# Patient Record
Sex: FEMALE | Race: WHITE | NOT HISPANIC OR LATINO | Employment: FULL TIME | URBAN - METROPOLITAN AREA
[De-identification: names, ages, dates, MRNs, and addresses within clinical notes are randomized per-mention and may not be internally consistent; named-entity substitution may affect disease eponyms.]

---

## 2017-01-27 ENCOUNTER — APPOINTMENT (OUTPATIENT)
Dept: RADIOLOGY | Facility: IMAGING CENTER | Age: 27
End: 2017-01-27
Attending: FAMILY MEDICINE
Payer: COMMERCIAL

## 2017-01-27 ENCOUNTER — OFFICE VISIT (OUTPATIENT)
Dept: URGENT CARE | Facility: CLINIC | Age: 27
End: 2017-01-27
Payer: COMMERCIAL

## 2017-01-27 VITALS
OXYGEN SATURATION: 98 % | TEMPERATURE: 98.8 F | RESPIRATION RATE: 14 BRPM | BODY MASS INDEX: 20.16 KG/M2 | HEIGHT: 68 IN | WEIGHT: 133 LBS | SYSTOLIC BLOOD PRESSURE: 116 MMHG | HEART RATE: 71 BPM | DIASTOLIC BLOOD PRESSURE: 78 MMHG

## 2017-01-27 DIAGNOSIS — M79.644 PAIN OF FINGER OF RIGHT HAND: ICD-10-CM

## 2017-01-27 DIAGNOSIS — S60.10XA SUBUNGUAL HEMATOMA OF DIGIT OF HAND, INITIAL ENCOUNTER: ICD-10-CM

## 2017-01-27 PROCEDURE — 73140 X-RAY EXAM OF FINGER(S): CPT | Mod: TC,RT | Performed by: FAMILY MEDICINE

## 2017-01-27 PROCEDURE — 99214 OFFICE O/P EST MOD 30 MIN: CPT | Performed by: FAMILY MEDICINE

## 2017-01-27 RX ORDER — HYDROCODONE BITARTRATE AND ACETAMINOPHEN 5; 325 MG/1; MG/1
1 TABLET ORAL EVERY 4 HOURS PRN
Qty: 5 TAB | Refills: 0 | Status: SHIPPED | OUTPATIENT
Start: 2017-01-27 | End: 2018-03-30

## 2017-01-27 NOTE — PROGRESS NOTES
"Subjective:      Chief Complaint   Patient presents with   • Finger Injury     smashed (R) middle finger with car door last night           Smashed right middle finger in car door last night.   Now c/o constant, throbbing finger pain, worse with palpation.   No numbness or tingling.            Social History   Substance Use Topics   • Smoking status: Never Smoker    • Smokeless tobacco: Never Used   • Alcohol Use: 0.0 oz/week     0 Standard drinks or equivalent per week      Comment: 3 drinks week         Past Medical History   Diagnosis Date   • ASTHMA      exercise         Current Outpatient Prescriptions on File Prior to Visit   Medication Sig Dispense Refill   • levonorgestrel-ethinyl estradiol (ANNY-28) 0.15-30 MG-MCG per tablet Take 1 Tab by mouth every day. 28 Tab 6   • Probiotic Product (PROBIOTIC DAILY PO) Take  by mouth.     • ranitidine (ZANTAC) 150 MG Tab Take 1 Tab by mouth 2 times a day. 60 Tab 2     No current facility-administered medications on file prior to visit.         Review of Systems   Constitutional: Negative for fever.   Respiratory: Negative for shortness of breath.    Cardiovascular: Negative for chest pain.   Neurological: Negative for tingling and numbness.   All other systems reviewed and are negative.         Objective:     Blood pressure 116/78, pulse 71, temperature 37.1 °C (98.8 °F), resp. rate 14, height 1.727 m (5' 7.99\"), weight 60.328 kg (133 lb), SpO2 98 %.    Physical Exam   Constitutional: pt is oriented to person, place, and time. Pt appears well-developed and well-nourished. No distress.   HENT:   Head: Normocephalic and atraumatic.   Eyes: Conjunctivae are normal.   Cardiovascular: Normal rate.    Pulmonary/Chest: Effort normal.   Musculoskeletal:   Right middle finger - full ROM.   There is a small subungual hematoma       Right hand: Normal sensation noted. Normal strength noted.   Neurological: pt is alert and oriented to person, place, and time.   Skin: Skin is " warm. Pt is not diaphoretic. No erythema.   Nursing note and vitals reviewed.    Narrative        1/27/2017 11:51 AM    HISTORY/REASON FOR EXAM:  Pain/Deformity Following Trauma.  .    TECHNIQUE/EXAM DESCRIPTION AND NUMBER OF VIEWS:  3 views of the RIGHT fingers.    COMPARISON: None    FINDINGS:  No acute fracture dislocation right third finger.         Impression        No acute fracture identified.         Reading Provider Reading Date     Go Garcia M.D. Jan 27, 2017               Assessment/Plan:          1. Subungual hematoma of digit of hand, initial encounter      X-rays were personally reviewed by myself.   There is no fracture.       She does not want trepanation currently    Ice prn    Rx motrin 800mg tid prn      Follow up in one week if no improvement, sooner if symptoms worsen.

## 2017-01-27 NOTE — MR AVS SNAPSHOT
"        Susan Garcia   2017 11:15 AM   Office Visit   MRN: [id number]    Department:  [facility]   Dept Phone:  [phone]    Description:  Female : 1990   Provider:  Feroz Mireles M.D.           Reason for Visit     Finger Injury smashed (R) middle finger with car door last night      Allergies as of 2017     Allergen Noted Reactions    Sulfa Drugs 1995   Unspecified    Last reaction was when she was a child. Has always stayed away from sulfa.      You were diagnosed with     Pain of finger of right hand   [[id number]]         Vital Signs     Blood Pressure Pulse Temperature Respirations Height Weight    116/78 mmHg 71 37.1 °C (98.8 °F) 14 1.727 m (5' 7.99\") 60.328 kg (133 lb)    Body Mass Index Oxygen Saturation Smoking Status             20.23 kg/m2 98% Never Smoker          Basic Information     Date Of Birth Sex Race Ethnicity Preferred Language    1990 Female White, Unknown Non- English      Problem List              ICD-10-CM Priority Class Noted - Resolved    Family planning Z30.09   10/8/2015 - Present    Preventative health care Z00.00   10/8/2015 - Present    Epigastric pain R10.13   10/8/2015 - Present    Left-sided low back pain with left-sided sciatica M54.42   3/9/2016 - Present      Health Maintenance        Date Due Completion Dates    IMM HEP B VACCINE (1 of 3 - Primary Series) 1991 ---    IMM HEP A VACCINE (1 of 2 - Standard Series) 1992 ---    IMM HPV VACCINE (1 of 3 - Female 3 Dose Series) 2002 ---    IMM VARICELLA (CHICKENPOX) VACCINE (1 of 2 - 2 Dose Adolescent Series) 2004 ---    PAP SMEAR 2012 ---    IMM INFLUENZA (1) 2016 ---    IMM DTaP/Tdap/Td Vaccine (2 - Td) 10/8/2025 10/8/2015            Current Immunizations     Tdap Vaccine 10/8/2015 11:40 AM      Below and/or attached are the medications your provider expects you to take. Review all of your home medications and newly ordered medications with your " provider and/or pharmacist. Follow medication instructions as directed by your provider and/or pharmacist. Please keep your medication list with you and share with your provider. Update the information when medications are discontinued, doses are changed, or new medications (including over-the-counter products) are added; and carry medication information at all times in the event of emergency situations     Allergies:  SULFA DRUGS - Unspecified               Medications  Valid as of: January 27, 2017 -  1:22 PM    Generic Name Brand Name Tablet Size Instructions for use    Hydrocodone-Acetaminophen (Tab) NORCO 5-325 MG Take 1 Tab by mouth every four hours as needed.        Levonorgestrel-Ethinyl Estrad (Tab) NORDETTE 0.15-30 MG-MCG Take 1 Tab by mouth every day.        Probiotic Product   Take  by mouth.        RaNITidine HCl (Tab) ZANTAC 150 MG Take 1 Tab by mouth 2 times a day.        .                 Medicines prescribed today were sent to:     [id number] - [place], [place] - [address]    [address] [place] [address]    Phone: [phone] Fax: [phone]    Open 24 Hours?: No      Medication refill instructions:       If your prescription bottle indicates you have medication refills left, it is not necessary to call your provider’s office. Please contact your pharmacy and they will refill your medication.    If your prescription bottle indicates you do not have any refills left, you may request refills at any time through one of the following ways: The online [employer] system (except Urgent Care), by calling your provider’s office, or by asking your pharmacy to contact your provider’s office with a refill request. Medication refills are processed only during regular business hours and may not be available until the next business day. Your provider may request additional information or to have a follow-up visit with you prior to refilling your medication.   *Please Note: Medication refills are  assigned a new Rx number when refilled electronically. Your pharmacy may indicate that no refills were authorized even though a new prescription for the same medication is available at the pharmacy. Please request the medicine by name with the pharmacy before contacting your provider for a refill.        Your To Do List     Future Labs/Procedures Complete By Expires    DX-FINGER(S) 2+ RIGHT  As directed 1/27/2018         [employer] Access Code: Activation code not generated  Current [employer] Status: Active

## 2017-03-15 ENCOUNTER — PATIENT MESSAGE (OUTPATIENT)
Dept: MEDICAL GROUP | Facility: MEDICAL CENTER | Age: 27
End: 2017-03-15

## 2017-03-15 DIAGNOSIS — Z30.09 FAMILY PLANNING: ICD-10-CM

## 2017-03-15 RX ORDER — LEVONORGESTREL AND ETHINYL ESTRADIOL 0.15-0.03
1 KIT ORAL DAILY
Qty: 84 TAB | Refills: 3 | Status: SHIPPED | OUTPATIENT
Start: 2017-03-15 | End: 2018-11-16 | Stop reason: SDUPTHER

## 2017-03-15 RX ORDER — LEVONORGESTREL AND ETHINYL ESTRADIOL 0.15-0.03
1 KIT ORAL DAILY
Qty: 28 TAB | Refills: 6 | Status: CANCELLED | OUTPATIENT
Start: 2017-03-15

## 2017-03-15 NOTE — TELEPHONE ENCOUNTER
Was the patient seen in the last year in this department? Yes     Does patient have an active prescription for medications requested? No     Received Request Via: PATIENT    Last OV: 11/18/2017  Next OV: not scheduled  Last Labs: 12/30/2016

## 2017-04-05 DIAGNOSIS — M54.42 ACUTE LEFT-SIDED LOW BACK PAIN WITH LEFT-SIDED SCIATICA: ICD-10-CM

## 2017-05-08 ENCOUNTER — OFFICE VISIT (OUTPATIENT)
Dept: URGENT CARE | Facility: PHYSICIAN GROUP | Age: 27
End: 2017-05-08
Payer: COMMERCIAL

## 2017-05-08 VITALS
BODY MASS INDEX: 19.47 KG/M2 | WEIGHT: 128 LBS | OXYGEN SATURATION: 98 % | TEMPERATURE: 98.5 F | RESPIRATION RATE: 12 BRPM | SYSTOLIC BLOOD PRESSURE: 108 MMHG | DIASTOLIC BLOOD PRESSURE: 70 MMHG | HEART RATE: 62 BPM

## 2017-05-08 DIAGNOSIS — J01.00 ACUTE NON-RECURRENT MAXILLARY SINUSITIS: ICD-10-CM

## 2017-05-08 PROCEDURE — 99214 OFFICE O/P EST MOD 30 MIN: CPT | Performed by: FAMILY MEDICINE

## 2017-05-08 RX ORDER — AMOXICILLIN AND CLAVULANATE POTASSIUM 875; 125 MG/1; MG/1
1 TABLET, FILM COATED ORAL 2 TIMES DAILY
Qty: 14 TAB | Refills: 0 | Status: SHIPPED | OUTPATIENT
Start: 2017-05-08 | End: 2017-05-15

## 2017-05-08 NOTE — PATIENT INSTRUCTIONS

## 2017-05-08 NOTE — MR AVS SNAPSHOT
Susan Garcia   2017 12:00 PM   Office Visit   MRN: [id number]    Department:  [facility]   Dept Phone:  [phone]    Description:  Female : 1990   Provider:  Tan Shanks M.D.           Reason for Visit     Sore Throat sore throat, congestion, sinus pain x1 week      Allergies as of 2017     Allergen Noted Reactions    Sulfa Drugs 1995   Unspecified    Last reaction was when she was a child. Has always stayed away from sulfa.      Vital Signs     Blood Pressure Pulse Temperature Respirations Weight Oxygen Saturation    108/70 mmHg 62 36.9 °C (98.5 °F) 12 58.06 kg (128 lb) 98%    Smoking Status                   Never Smoker            Basic Information     Date Of Birth Sex Race Ethnicity Preferred Language    1990 Female White, Unknown Non- English      Problem List              ICD-10-CM Priority Class Noted - Resolved    Family planning Z30.09   10/8/2015 - Present    Preventative health care Z00.00   10/8/2015 - Present    Epigastric pain R10.13   10/8/2015 - Present    Left-sided low back pain with left-sided sciatica M54.42   3/9/2016 - Present      Health Maintenance        Date Due Completion Dates    IMM HEP B VACCINE (1 of 3 - Primary Series) 1991 ---    IMM HEP A VACCINE (1 of 2 - Standard Series) 1992 ---    IMM HPV VACCINE (1 of 3 - Female 3 Dose Series) 2002 ---    IMM VARICELLA (CHICKENPOX) VACCINE (1 of 2 - 2 Dose Adolescent Series) 2004 ---    PAP SMEAR 2012 ---    IMM DTaP/Tdap/Td Vaccine (2 - Td) 10/8/2025 10/8/2015            Current Immunizations     Tdap Vaccine 10/8/2015 11:40 AM      Below and/or attached are the medications your provider expects you to take. Review all of your home medications and newly ordered medications with your provider and/or pharmacist. Follow medication instructions as directed by your provider and/or pharmacist. Please keep your medication list with you and share with your  provider. Update the information when medications are discontinued, doses are changed, or new medications (including over-the-counter products) are added; and carry medication information at all times in the event of emergency situations     Allergies:  SULFA DRUGS - Unspecified               Medications  Valid as of: May 08, 2017 - 12:25 PM    Generic Name Brand Name Tablet Size Instructions for use    Hydrocodone-Acetaminophen (Tab) NORCO 5-325 MG Take 1 Tab by mouth every four hours as needed.        Levonorgestrel-Ethinyl Estrad (Tab) NORDETTE 0.15-30 MG-MCG Take 1 Tab by mouth every day.        Probiotic Product   Take  by mouth.        RaNITidine HCl (Tab) ZANTAC 150 MG Take 1 Tab by mouth 2 times a day.        .                 Medicines prescribed today were sent to:     [id number] - [place], [place] - [address]    [address] [place] [address]    Phone: [phone] Fax: [phone]    Open 24 Hours?: No      Medication refill instructions:       If your prescription bottle indicates you have medication refills left, it is not necessary to call your provider’s office. Please contact your pharmacy and they will refill your medication.    If your prescription bottle indicates you do not have any refills left, you may request refills at any time through one of the following ways: The online [employer] system (except Urgent Care), by calling your provider’s office, or by asking your pharmacy to contact your provider’s office with a refill request. Medication refills are processed only during regular business hours and may not be available until the next business day. Your provider may request additional information or to have a follow-up visit with you prior to refilling your medication.   *Please Note: Medication refills are assigned a new Rx number when refilled electronically. Your pharmacy may indicate that no refills were authorized even though a new prescription for the same medication is  available at the pharmacy. Please request the medicine by name with the pharmacy before contacting your provider for a refill.           MyChart Access Code: Activation code not generated  Current MyChart Status: Active

## 2017-05-30 ASSESSMENT — ENCOUNTER SYMPTOMS
FEVER: 0
CHILLS: 0
SHORTNESS OF BREATH: 0
VOMITING: 0
HEADACHES: 1

## 2017-05-31 NOTE — PROGRESS NOTES
Subjective:      Susan Garcia is a 27 y.o. female who presents with Sore Throat            Pharyngitis   This is a new problem. The current episode started in the past 7 days. The problem has been gradually worsening. Neither side of throat is experiencing more pain than the other. The pain is moderate. Associated symptoms include congestion and headaches. Pertinent negatives include no shortness of breath or vomiting.       Review of Systems   Constitutional: Negative for fever and chills.   HENT: Positive for congestion.    Respiratory: Negative for shortness of breath.    Gastrointestinal: Negative for vomiting.   Neurological: Positive for headaches.     Allergies   Allergen Reactions   • Sulfa Drugs Unspecified     Last reaction was when she was a child. Has always stayed away from sulfa.         Objective:     /70 mmHg  Pulse 62  Temp(Src) 36.9 °C (98.5 °F)  Resp 12  Wt 58.06 kg (128 lb)  SpO2 98%     Physical Exam   Constitutional: She is oriented to person, place, and time. She appears well-developed and well-nourished. No distress.   HENT:   Head: Normocephalic and atraumatic.   Right Ear: External ear normal.   Left Ear: External ear normal.   Nose: Mucosal edema and rhinorrhea present. Right sinus exhibits maxillary sinus tenderness. Left sinus exhibits maxillary sinus tenderness.   Mouth/Throat: Uvula is midline. Posterior oropharyngeal edema and posterior oropharyngeal erythema present. No oropharyngeal exudate or tonsillar abscesses.   Eyes: Conjunctivae and EOM are normal. Pupils are equal, round, and reactive to light.   Cardiovascular: Normal rate, regular rhythm, normal heart sounds and intact distal pulses.    No murmur heard.  Pulmonary/Chest: Effort normal and breath sounds normal. No respiratory distress.   Abdominal: Soft. Bowel sounds are normal. She exhibits no distension. There is no tenderness.   Musculoskeletal: Normal range of motion.   Neurological: She is alert  and oriented to person, place, and time. She has normal reflexes. No sensory deficit.   Skin: Skin is warm and dry.   Psychiatric: She has a normal mood and affect. Her behavior is normal.               Assessment/Plan:     1. Acute non-recurrent maxillary sinusitis  Differential diagnosis, natural history, supportive care, and indications for immediate follow-up discussed.   - amoxicillin-clavulanate (AUGMENTIN) 875-125 MG Tab; Take 1 Tab by mouth 2 times a day for 7 days.  Dispense: 14 Tab; Refill: 0

## 2017-12-12 ENCOUNTER — TELEPHONE (OUTPATIENT)
Dept: MEDICAL GROUP | Facility: LAB | Age: 27
End: 2017-12-12

## 2017-12-12 DIAGNOSIS — R10.13 EPIGASTRIC PAIN: ICD-10-CM

## 2017-12-12 NOTE — TELEPHONE ENCOUNTER
Patient is in need of an updated referral for GI to be sent to  [facility]  [address]  [place]  [address]  Phone: [phone]    Pending in orders

## 2018-03-30 ENCOUNTER — OFFICE VISIT (OUTPATIENT)
Dept: URGENT CARE | Facility: PHYSICIAN GROUP | Age: 28
End: 2018-03-30
Payer: COMMERCIAL

## 2018-03-30 VITALS
DIASTOLIC BLOOD PRESSURE: 70 MMHG | RESPIRATION RATE: 16 BRPM | SYSTOLIC BLOOD PRESSURE: 102 MMHG | BODY MASS INDEX: 19.7 KG/M2 | TEMPERATURE: 98.6 F | WEIGHT: 130 LBS | HEART RATE: 70 BPM | OXYGEN SATURATION: 100 % | HEIGHT: 68 IN

## 2018-03-30 DIAGNOSIS — R51.9 RIGHT-SIDED HEADACHE: ICD-10-CM

## 2018-03-30 DIAGNOSIS — J01.90 ACUTE RHINOSINUSITIS: ICD-10-CM

## 2018-03-30 PROCEDURE — 99214 OFFICE O/P EST MOD 30 MIN: CPT | Performed by: PHYSICIAN ASSISTANT

## 2018-03-30 RX ORDER — KETOROLAC TROMETHAMINE 30 MG/ML
60 INJECTION, SOLUTION INTRAMUSCULAR; INTRAVENOUS ONCE
Status: COMPLETED | OUTPATIENT
Start: 2018-03-30 | End: 2018-03-30

## 2018-03-30 RX ORDER — FLUTICASONE PROPIONATE 50 MCG
1 SPRAY, SUSPENSION (ML) NASAL 2 TIMES DAILY
Qty: 16 G | Refills: 0 | Status: SHIPPED | OUTPATIENT
Start: 2018-03-30 | End: 2019-04-03

## 2018-03-30 RX ADMIN — KETOROLAC TROMETHAMINE 60 MG: 30 INJECTION, SOLUTION INTRAMUSCULAR; INTRAVENOUS at 13:15

## 2018-03-30 NOTE — PROGRESS NOTES
Chief Complaint   Patient presents with   • Sinus Problem     poss sinus infection       HISTORY OF PRESENT ILLNESS: Patient is a 27 y.o. female who presents today for about 3 days of possible sinus pressure/pain, right eye pain/pressure and ear muffling on the right side.  Patient states she is also having discomfort behind her right eye area.   Patient states she took Sudafed and Tylenol cold and sinus without much relief.  Patient denies vision decrease/ changes in the right eye.  Patient states she had a little bit of sore throat on Tuesday night and today she is stuffy on both sides in the nose.    Patient was seen by [facility] clinic and was told if Ibuprofen did not work for her symptoms she may need a CT scan which concerned the patient.     No numbness, tingling or weakness in extremities.  No nausea or vomiting.  No dizziness.   No fevers. No light sensitivity.     Patient Active Problem List    Diagnosis Date Noted   • Left-sided low back pain with left-sided sciatica 03/09/2016   • Family planning 10/08/2015   • Preventative health care 10/08/2015   • Epigastric pain 10/08/2015       Allergies:Sulfa drugs    Current Outpatient Prescriptions Ordered in [facility]   Medication Sig Dispense Refill   • amoxicillin-clavulanate (AUGMENTIN) 875-125 MG Tab Take 1 Tab by mouth 2 times a day. 20 Tab 0   • fluticasone (FLONASE ALLERGY RELIEF) 50 MCG/ACT nasal spray Spray 1 Spray in nose 2 times a day. 16 g 0   • levonorgestrel-ethinyl estradiol (ANNY-28) 0.15-30 MG-MCG per tablet Take 1 Tab by mouth every day. 84 Tab 3     No current Epic-ordered facility-administered medications on file.        Past Medical History:   Diagnosis Date   • ASTHMA     exercise       Social History   Substance Use Topics   • Smoking status: Never Smoker   • Smokeless tobacco: Never Used   • Alcohol use 0.0 oz/week      Comment: 3 drinks week       Family Status   Relation Status   • Mother Alive   • Father Alive   • Sister Alive  "    Family History   Problem Relation Age of Onset   • Hypertension Father    • Asthma Sister    • Diabetes Paternal Grandfather        ROS:  Review of Systems   Constitutional: SEE HPI  HENT: SEE HPI  Eyes: SEE HPI  Respiratory: Negative for cough, sputum production, shortness of breath and wheezing.    Cardiovascular: Negative for chest pain, palpitations, orthopnea and leg swelling.   Gastrointestinal: Negative for heartburn, nausea, vomiting and abdominal pain.   All other systems reviewed and are negative.        Exam:  Blood pressure 102/70, pulse 70, temperature 37 °C (98.6 °F), resp. rate 16, height 1.727 m (5' 8\"), weight 59 kg (130 lb), SpO2 100 %.  General:  Well nourished, well developed female in NAD  Eyes: PERRLA, EOM within normal limits, no conjunctival injection, no scleral icterus, visual fields and acuity grossly intact.  No temporal pain.   Ears: Normal shape and symmetry, no tenderness, no discharge. External canals are without any significant edema or erythema. Tympanic membranes are without any inflammation, no effusion. Gross auditory acuity is intact  Nose: Symmetrical, mild right maxillary sinus TTP without swelling, clear rhinorrhea.   Mouth: reasonable hygiene, no erythema exudates or tonsillar enlargement.  Neck: no masses, range of motion within normal limits, no tracheal deviation. No lymphadenopathy  Pulmonary: Normal respiratory effort, no wheezes, crackles, or rhonchi.  Cardiovascular: regular rate and rhythm without murmurs, rubs, or gallops.  Abdomen: Soft, nontender, nondistended. Normal bowel sounds. No hepatosplenomegaly or masses, or hernias. No rebound or guarding.  Skin: No visible rashes or lesion. Warm, pink, dry.   Extremities: no clubbing, cyanosis, or edema.  Neuro: A&O x 3.  CN 2-12 grossly intact.  Motor strength upper and lower full and intact bilaterally.  Speech normal/clear.  Normal coordination. Normal gait.         Assessment/Plan:  1. Right-sided headache  " ketorolac (TORADOL) injection 60 mg    REFERRAL TO NEUROLOGY   2. Acute rhinosinusitis  fluticasone (FLONASE ALLERGY RELIEF) 50 MCG/ACT nasal spray    amoxicillin-clavulanate (AUGMENTIN) 875-125 MG Tab       -benign neurological exam.  Patient describing right sided sinus pressure with URI symptoms of ear fullness, nasal congestion along with  eye pain.  No vision changes or focal neurologic symptoms.   -Toradol shot given in clinic, patient tolerated well.  Monitored for 10 mins s/p shot.  Given for headache.   -recommend sinus care, Flonase, sinus rinses/saline.  -neurological and headache red flags and ER precautions discussed with patient.  If any increasing eye pain, pressure, or ANY vision changes, ED recommended however there is no evidence of infection or process directly involving orbit/eye today.       Supportive care, differential diagnoses, and indications for immediate follow-up discussed with patient.   Pathogenesis of diagnosis discussed including typical length and natural progression.   Instructed to return to clinic or nearest emergency department for any change in condition, further concerns, or worsening of symptoms.  Patient states understanding of the plan of care and discharge instructions.  Instructed to make an appointment, for follow up, with their primary care provider.      Nyla Sifuentes P.A.-C.        04/02/18-  Patient with lack of resolution of symptoms performed head CT without contrast      FINDINGS:  The calvariae and skull base are unremarkable. There are no extraaxial fluid collections. The ventricular system and basal cisterns are within normal limits. There are no areas of abnormal density in the brain substance. There are no hemorrhagic lesions.   There are no mass effects or shift of midline structures.    The brainstem and posterior fossa structures are unremarkable.    Paranasal sinuses in the field of view are unremarkable.    Mastoids in the field of view are  unremarkable.     Impression       1.  Head CT without contrast within normal limits. No evidence of acute cerebral infarction, hemorrhage or mass lesion.   Reading Provider Reading Date   Zulma Hernandez M.D. Apr 2, 2018       -unremarkable.    -she is continuing to have sinus congestion and some pain around the ear area.   Will start her on Augmentin.  Continue sinus care  -will additionally place referral to headache clinic/Neuro for follow up  -ER precautions and RTC re-emphasized if any vision changes, new or worsening pain, etc        Nyla Sifuentes P.A.-C.

## 2018-04-01 ENCOUNTER — TELEPHONE (OUTPATIENT)
Dept: URGENT CARE | Facility: PHYSICIAN GROUP | Age: 28
End: 2018-04-01

## 2018-04-01 DIAGNOSIS — R51.9 ACUTE INTRACTABLE HEADACHE, UNSPECIFIED HEADACHE TYPE: ICD-10-CM

## 2018-04-01 NOTE — TELEPHONE ENCOUNTER
Pt called stating that she is not feeling better and was told to call back to get further instructions.    Please call her at [phone]

## 2018-04-02 ENCOUNTER — HOSPITAL ENCOUNTER (OUTPATIENT)
Dept: RADIOLOGY | Facility: MEDICAL CENTER | Age: 28
End: 2018-04-02
Attending: PHYSICIAN ASSISTANT
Payer: COMMERCIAL

## 2018-04-02 DIAGNOSIS — R51.9 ACUTE INTRACTABLE HEADACHE, UNSPECIFIED HEADACHE TYPE: ICD-10-CM

## 2018-04-02 PROCEDURE — 70450 CT HEAD/BRAIN W/O DYE: CPT

## 2018-04-02 RX ORDER — AMOXICILLIN AND CLAVULANATE POTASSIUM 875; 125 MG/1; MG/1
1 TABLET, FILM COATED ORAL 2 TIMES DAILY
Qty: 20 TAB | Refills: 0 | Status: SHIPPED | OUTPATIENT
Start: 2018-04-02 | End: 2019-04-03

## 2018-10-10 ENCOUNTER — OFFICE VISIT (OUTPATIENT)
Dept: URGENT CARE | Facility: PHYSICIAN GROUP | Age: 28
End: 2018-10-10
Payer: COMMERCIAL

## 2018-10-10 VITALS
OXYGEN SATURATION: 98 % | SYSTOLIC BLOOD PRESSURE: 118 MMHG | HEART RATE: 73 BPM | DIASTOLIC BLOOD PRESSURE: 62 MMHG | TEMPERATURE: 98.8 F | WEIGHT: 134 LBS | BODY MASS INDEX: 20.31 KG/M2 | HEIGHT: 68 IN

## 2018-10-10 DIAGNOSIS — R09.82 POSTNASAL DRIP: ICD-10-CM

## 2018-10-10 LAB
INT CON NEG: NORMAL
INT CON POS: NORMAL
S PYO AG THROAT QL: NEGATIVE

## 2018-10-10 PROCEDURE — 99214 OFFICE O/P EST MOD 30 MIN: CPT | Performed by: FAMILY MEDICINE

## 2018-10-10 PROCEDURE — 87880 STREP A ASSAY W/OPTIC: CPT | Performed by: FAMILY MEDICINE

## 2018-10-10 RX ORDER — FLUTICASONE PROPIONATE 50 MCG
1 SPRAY, SUSPENSION (ML) NASAL 2 TIMES DAILY
Qty: 1 BOTTLE | Refills: 0
Start: 2018-10-10 | End: 2019-04-03

## 2018-10-10 RX ORDER — FEXOFENADINE HCL AND PSEUDOEPHEDRINE HCI 60; 120 MG/1; MG/1
1 TABLET, EXTENDED RELEASE ORAL 2 TIMES DAILY
Qty: 60 TAB | Refills: 0 | Status: SHIPPED | OUTPATIENT
Start: 2018-10-10 | End: 2019-04-03

## 2018-10-10 RX ORDER — IBUPROFEN 200 MG
200 TABLET ORAL EVERY 6 HOURS PRN
COMMUNITY
End: 2019-04-03

## 2018-10-10 NOTE — PROGRESS NOTES
"Subjective:     CC:  presents with Pharyngitis            Pharyngitis   This is a new problem. The current episode started in the past 2 days. The problem has been unchanged. There has been no fever. The pain is mild, but it hurts to swallow solids and liquids. Associated symptoms includes: nasal congestion, bilat ear congestion, dry cough. Pertinent negatives include no abdominal pain,  , diarrhea, headaches, shortness of breath or vomiting. no exposure to strep or mono.   has tried nothing for the symptoms.  She does have a hx of seasonal allergies.     Social History   Substance Use Topics   • Smoking status: Never Smoker   • Smokeless tobacco: Never Used   • Alcohol use 0.0 oz/week      Comment: 3 drinks week       Past Medical History:   Diagnosis Date   • ASTHMA     exercise       Review of Systems   Constitutional:   Negative for fever and weight loss.   HENT: Positive for nasal  congestion.    Respiratory: Negative for   sputum production and shortness of breath.    Cardiovascular: Negative for chest pain.   Gastrointestinal: Negative for nausea, vomiting, abdominal pain and diarrhea.   Genitourinary: Negative.    Neurological: Negative for dizziness and headaches.   All other systems reviewed and are negative.         Objective:   Blood pressure 118/62, pulse 73, temperature 37.1 °C (98.8 °F), height 1.727 m (5' 8\"), weight 60.8 kg (134 lb), SpO2 98 %, not currently breastfeeding.        Physical Exam   Constitutional:   oriented to person, place, and time.  appears well-developed and well-nourished. No distress.   HENT:   Head: Normocephalic and atraumatic.   Right Ear: External ear normal.   Left Ear: External ear normal.   TMs both normal  Nose: Mucosal edema present. Right sinus exhibits no maxillary sinus tenderness and no frontal sinus tenderness. Left sinus exhibits no maxillary sinus tenderness and no frontal sinus tenderness.   Mouth/Throat: no posterior oropharyngeal exudate.   There is posterior " oropharyngeal erythema and clear postnasal drip present. No posterior oropharyngeal edema.   Tonsils absent     Eyes: Conjunctivae and EOM are normal. Pupils are equal, round, and reactive to light. Right eye exhibits no discharge. Left eye exhibits no discharge. No scleral icterus.   Neck: Normal range of motion. Neck supple. No JVD present. No tracheal deviation present. No thyromegaly present.   Cardiovascular: Normal rate, regular rhythm, normal heart sounds and intact distal pulses.  Exam reveals no friction rub.    No murmur heard.  Pulmonary/Chest: Effort normal and breath sounds normal. No respiratory distress.   no wheezes.   no rales.    Musculoskeletal:  exhibits no edema.   Lymphadenopathy: no cervical LAD  Neurological:   alert and oriented to person, place, and time.   Skin: Skin is warm and dry. No erythema.   Psychiatric:   normal mood and affect.   Nursing note and vitals reviewed.             Assessment/Plan:     1. Postnasal drip  Rapid strep neg  - fexofenadine-pseudoephedrine (ALLEGRA-D)  MG per tablet; Take 1 Tab by mouth 2 times a day.  Dispense: 60 Tab; Refill: 0  - fluticasone (FLONASE) 50 MCG/ACT nasal spray; Spray 1 Spray in nose 2 times a day.  Dispense: 1 Bottle; Refill: 0      Follow up in one week if no improvement, sooner if symptoms worsen.

## 2018-11-16 DIAGNOSIS — Z30.09 FAMILY PLANNING: ICD-10-CM

## 2018-11-16 RX ORDER — LEVONORGESTREL AND ETHINYL ESTRADIOL 0.15-0.03
1 KIT ORAL DAILY
Qty: 84 TAB | Refills: 1 | Status: ON HOLD | OUTPATIENT
Start: 2018-11-16 | End: 2022-04-02

## 2018-11-16 NOTE — TELEPHONE ENCOUNTER
Dr. Daniel covering for Vijaya Oneal.  Patient needs to establish with a new PCP for future refills.

## 2018-11-16 NOTE — TELEPHONE ENCOUNTER
Phone Number Called: [phone] (home)     Message: I left a voicemail that Dr. Daniel had approve her Rx refill. Please call us back to schedule an appointment to with a new Pcp. Thank you!    Left Message for patient to call back: yes

## 2019-03-30 ENCOUNTER — HOSPITAL ENCOUNTER (OUTPATIENT)
Dept: RADIOLOGY | Facility: MEDICAL CENTER | Age: 29
End: 2019-03-30
Attending: EMERGENCY MEDICINE
Payer: COMMERCIAL

## 2019-03-30 ENCOUNTER — OFFICE VISIT (OUTPATIENT)
Dept: URGENT CARE | Facility: PHYSICIAN GROUP | Age: 29
End: 2019-03-30
Payer: COMMERCIAL

## 2019-03-30 VITALS
OXYGEN SATURATION: 100 % | TEMPERATURE: 99.4 F | BODY MASS INDEX: 21.67 KG/M2 | HEART RATE: 79 BPM | DIASTOLIC BLOOD PRESSURE: 76 MMHG | WEIGHT: 143 LBS | SYSTOLIC BLOOD PRESSURE: 108 MMHG | HEIGHT: 68 IN

## 2019-03-30 DIAGNOSIS — S69.92XA HAND INJURY, LEFT, INITIAL ENCOUNTER: ICD-10-CM

## 2019-03-30 DIAGNOSIS — S60.419A: ICD-10-CM

## 2019-03-30 DIAGNOSIS — S62.619A CLOSED FRACTURE OF PROXIMAL PHALANX OF DIGIT OF LEFT HAND, INITIAL ENCOUNTER: ICD-10-CM

## 2019-03-30 DIAGNOSIS — S60.512A: ICD-10-CM

## 2019-03-30 PROCEDURE — 73130 X-RAY EXAM OF HAND: CPT | Mod: LT

## 2019-03-30 PROCEDURE — 99202 OFFICE O/P NEW SF 15 MIN: CPT | Performed by: EMERGENCY MEDICINE

## 2019-03-30 ASSESSMENT — ENCOUNTER SYMPTOMS
SENSORY CHANGE: 0
FOCAL WEAKNESS: 0
NUMBNESS: 0
TINGLING: 0

## 2019-03-30 NOTE — PROGRESS NOTES
Subjective:      Susan Garcia is a 28 y.o. female who presents with Hand Injury (L hand injury, swollen, bruised)            Hand Injury   This is a new problem. The current episode started today. Pertinent negatives include no numbness. The symptoms are aggravated by bending. She has tried ice and acetaminophen for the symptoms. The treatment provided moderate relief.   Patient notes injury associated with holding rope; attempting to load horse onto a trailer, and was pulled on.  Tetanus up-to-date; denies additional injury.    Review of Systems   Musculoskeletal:        No pain proximal or distal to the sites.   Neurological: Negative for tingling, sensory change, focal weakness and numbness.     PMH:  has a past medical history of ASTHMA. She also has no past medical history of Diabetes.  MEDS:   Current Outpatient Prescriptions:   •  levonorgestrel-ethinyl estradiol (ANNY-28) 0.15-30 MG-MCG per tablet, Take 1 Tab by mouth every day., Disp: 84 Tab, Rfl: 1  •  ibuprofen (MOTRIN) 200 MG Tab, Take 200 mg by mouth every 6 hours as needed., Disp: , Rfl:   •  fexofenadine-pseudoephedrine (ALLEGRA-D)  MG per tablet, Take 1 Tab by mouth 2 times a day. (Patient not taking: Reported on 3/30/2019), Disp: 60 Tab, Rfl: 0  •  fluticasone (FLONASE) 50 MCG/ACT nasal spray, Spray 1 Spray in nose 2 times a day. (Patient not taking: Reported on 3/30/2019), Disp: 1 Bottle, Rfl: 0  •  amoxicillin-clavulanate (AUGMENTIN) 875-125 MG Tab, Take 1 Tab by mouth 2 times a day. (Patient not taking: Reported on 3/30/2019), Disp: 20 Tab, Rfl: 0  •  fluticasone (FLONASE ALLERGY RELIEF) 50 MCG/ACT nasal spray, Spray 1 Spray in nose 2 times a day. (Patient not taking: Reported on 3/30/2019), Disp: 16 g, Rfl: 0  ALLERGIES:   Allergies   Allergen Reactions   • Sulfa Drugs Unspecified     Last reaction was when she was a child. Has always stayed away from sulfa.     SURGHX:   Past Surgical History:   Procedure Laterality Date   •  "TONSILLECTOMY       SOCHX:  reports that she has never smoked. She has never used smokeless tobacco. She reports that she drinks alcohol. She reports that she does not use drugs.  FH: family history includes Asthma in her sister; Diabetes in her paternal grandfather; Hypertension in her father.       Objective:     /76 (BP Location: Right arm, Patient Position: Sitting, BP Cuff Size: Adult)   Pulse 79   Temp 37.4 °C (99.4 °F) (Temporal)   Ht 1.727 m (5' 8\")   Wt 64.9 kg (143 lb)   LMP 03/01/2019   SpO2 100%   BMI 21.74 kg/m²      Physical Exam   Constitutional: She does not have a sickly appearance. She does not appear ill. No distress.   Cardiovascular:   Pulses:       Radial pulses are 2+ on the left side.   Distal capillary refill intact.   Musculoskeletal:        Left wrist: Normal.        Left forearm: Normal.        Left hand: She exhibits decreased range of motion, tenderness, bony tenderness, deformity and swelling.   Tenderness, swelling proximal middle, ring, little fingers.  Flexor and extensor function grossly intact.  Limited ability to flex at PIP joint long finger.  Some rotational deformity ring finger.   Skin: Skin is warm and dry. Abrasion noted.        Psychiatric: She has a normal mood and affect.               Assessment/Plan:     1. Closed fracture of proximal phalanx of digit of left hand, initial encounter  Elevation, ice, OTC analgesia.  Ulnar gutter splint involving middle, ring, little fingers.  REF HAND SURGERY    2. Abrasion of multiple sites of hand and finger, left, initial encounter  Cleansed and dressed.    3. Hand injury, left, initial encounter  - DX-HAND 3+ LEFT; per radiologist:  Angulated comminuted and mildly displaced acute fractures of the diaphysis of the third fourth and fifth proximal phalanges are identified. Fracture line in the third digit does appear to extend to the distal   articular surface where there is displacement along the surface. Fracture line in " the fourth digit also appears to extend to the distal articular surface. Fracture line in the fifth digit possibly extends to the proximal articular surface but does not extend to the distal articular surface.  Soft tissue swelling is noted.

## 2019-03-31 NOTE — PATIENT INSTRUCTIONS
Fracture Care, Generic  The 206 bones in our body are important for supporting our body (skeleton) and also for production of blood cells by the bone marrow. A fracture is a break in a tissue. A tissue is a collection of cells that performs a function or job in our body. We most commonly think of fractures in bones.  When a bone is broken, or fractured, it affects not only blood production and function. There may also be other damage when structures near the bones are injured.  There are three main types of fractures:  · Open - where there is a wound leading to the fracture site or the bone is protruding from the skin.   · Closed - where the bone has fractured but has no external wound.   · Complicated - this may involve damage to associated vital organs and major blood vessels as a result of the fracture.   Fractures are usually managed by keeping the bones in place long enough for them to heal. This is usually done with splints or casts. Sometimes surgery is required and pins, plates and screws may be used to hold fractures in proper position. The amount of time it takes a fracture to heal depends mostly on the age and health of the patient.  Young children are prone to fractures. These fractures heal rather quickly. The common fractures suffered by children tend to be associated with the arms and wrists. As young bones do not harden for some years, children's fractures tend to 'bend and splinter', similar to a broken branch on a tree. This the reason for the name, 'greenstick fracture'.  As we grow older, there may be a loss of bone known as osteopenia or osteoporosis. These conditions make breaking a bone much easier. Sometimes a minor accident or simply over-use may produce a fracture. These fractures do not heal as fast a younger person's.  SYMPTOMS  The signs and symptoms of fractures of bones depend on how bad the injury is. If shock is present, there may be pale, cool, clammy skin with a rapid, weak pulse.  There is usually pain and tenderness in the area of the fracture. There may or may not be deformity of the bone. There may be injury to surrounding tissues.  TREATMENT  · Care and treatment of fractures relies on immobilization and adequate splinting of the injury. If the fracture is complex, the wound associated with an open fracture may be difficult to handle without professional help.   · If the pulse to the end part of the limb (distal pulse) cannot be restored by gentle traction, then the limb should be stabilized in its current position. Urgent ambulance transport should be obtained. Do not waste time with splinting.   · Generally, fractured limbs should be made immobile and left for medical aid. In remote areas or some distance from medical aid, you may be required to treat as follows.   FRACTURED FOREARM  · Check for pulse at the end part of the limb. If none - gentle traction until pulse returns   · Treat any wounds   · Pad bony prominences   · Apply adequate splinting.   · Secure above and below fracture, secure wrist.   · Reassess pulse or return of color and/or warmth.   · Elevate injury with arm sling.    FRACTURED UPPER ARM  · Check for pulse at the end part of the limb, if none - gentle traction until pulse returns.   · Treat any wounds.   · Pad between arm and chest.   · Apply 'collar and cuff' sling, secure above and below fracture firmly against chest with triangular bandages.   · Reassess pulse or return of color and/or warmth.    FRACTURED LEG  · Check for circulation and pulse at the end part of the limb (skin color and temperature). If no circulation, apply gentle traction until pulse or color returns.   · Call '911' for an ambulance.   · Treat any wounds.   · Immobilize (keep it from moving) the limb.   · Pad bony prominences.   · Reassess circulation below injury.   FRACTURED PELVIS  · Call '911' for an ambulance.   · Check for pulses in both legs.   · Bend legs at knees, elevate lower legs  slightly and support on pillows or something padded.   · Support both hips with folded blankets either side.   · Discourage attempts to urinate.   · Care must be exercised with a suspected fractured pelvis. This injury may have serious complications. The casualty should always be transported by ambulance and not by alternative means unless absolutely necessary.   FRACTURED JAW  · A common injury in certain contact sports is dislocation, or fracture of the lower jaw (mandible). The casualty will have pain in the jaw, be unable to speak properly, and may have trouble swallowing.   · Call '911' for an ambulance.   · Support the jaw.   · Sit the injured person leaning slightly forward.   · Rest the injured jaw on a pad held by the injured person.   · DO NOT apply a bandage to support the jaw.   · Observe the casualty carefully for signs of breathing difficulties and any indication that he or she is becoming drowsy or unconscious.   SLINGS  · Slings are used to support an injured arm, or to supplement treatment for another injury such as fractured ribs. Generally, the most effective sling is made with a triangular bandage. Every first aid kit, no matter how small, should have at least one of these bandages.   · Although triangular bandages are preferable, any material (tie, belt, or piece of thick twine or rope) can be used in an emergency. If no likely material is at hand, an injured arm can be adequately supported by inserting it inside the injured person's shirt or blouse. Similarly, a safety pin applied to a sleeve and secured to clothing on the chest may work well enough.   · There are essentially three types of sling; the arm sling for injuries to the forearm, the elevated sling for injuries to the shoulder, and the 'collar-and-cuff' or clove hitch for injuries to the upper arm and as supplementary support to fractured ribs.   · After application of any sling, always check the circulation to the limb by feeling for  "the pulse at the wrist, or by squeezing a fingernail and observing for change of color in the nail bed. All slings must be in a position that is comfortable for the injured person. Never force an arm into the 'right position'.   ARM SLING  · Support the injured forearm approximately parallel to the ground with the wrist slightly higher than the elbow.   · Place an opened triangular bandage between the body and the arm, with its apex towards the elbow.   · Extend the upper point of the bandage over the shoulder on the uninjured side.   · Bring the lower point up over the arm, across the shoulder on the injured side to join the upper point and tie firmly with a reef knot.   · Ensure the elbow is secured by folding the excess bandage over the elbow and securing with a safety pin.   ELEVATED SLING  · Support the injured arm with the elbow beside the body and the hand extended towards the uninjured shoulder.   · Place an opened triangular bandage over the forearm and hand, with the apex towards the elbow.   · Extend the upper point of the bandage over the uninjured shoulder.   · Tuck the lower part of the bandage under the injured arm, bring it under the elbow and around the back and extend the lower point up to meet the upper point at the shoulder.   · Tie firmly with a reef knot.   · Secure the elbow by folding the excess material and applying a safety pin, then ensure that the sling is tucked under the arm giving firm support.   COLLAR-AND-CUFF (CLOVE HITCH)   · Allow the elbow to hang naturally at the side and place the hand extended towards the shoulder on the uninjured side.   · Form a clove hitch by forming two loops - one towards you, one away from you.   · Put the loops together by sliding your hands under the loops and closing with a \"clapping\" motion. If you are experienced at forming a clove hitch, then apply a clove hitch directly on the wrist, but take care not to move the injured arm.   · Slide the clove " hitch over the hand and gently pull it firmly to secure the wrist.   · Extend the points of the bandage to either side of the neck and tie firmly with a reef knot.   · Allow the arm to hang comfortably. For support to fractured ribs, apply triangular bandages around the body and upper arm to hold the arm firmly against the chest.   If your caregiver has given you a follow-up appointment, it is very important to keep that appointment. This includes any orthopedic referrals, physical therapy, and rehabilitation. Any delay in obtaining necessary care could result in a delay or failure of the bones to heal. Not keeping the appointment could result in a chronic or permanent injury, pain, and disability. If there is any problem keeping the appointment, you must call back to this facility for assistance.

## 2019-04-03 DIAGNOSIS — Z01.812 PRE-OPERATIVE LABORATORY EXAMINATION: ICD-10-CM

## 2019-04-03 PROCEDURE — 36415 COLL VENOUS BLD VENIPUNCTURE: CPT

## 2019-04-03 PROCEDURE — 84703 CHORIONIC GONADOTROPIN ASSAY: CPT

## 2019-04-04 ENCOUNTER — HOSPITAL ENCOUNTER (OUTPATIENT)
Facility: MEDICAL CENTER | Age: 29
End: 2019-04-04
Attending: ORTHOPAEDIC SURGERY | Admitting: ORTHOPAEDIC SURGERY
Payer: COMMERCIAL

## 2019-04-04 ENCOUNTER — APPOINTMENT (OUTPATIENT)
Dept: RADIOLOGY | Facility: MEDICAL CENTER | Age: 29
End: 2019-04-04
Attending: ORTHOPAEDIC SURGERY
Payer: COMMERCIAL

## 2019-04-04 ENCOUNTER — ANESTHESIA EVENT (OUTPATIENT)
Dept: SURGERY | Facility: MEDICAL CENTER | Age: 29
End: 2019-04-04
Payer: COMMERCIAL

## 2019-04-04 ENCOUNTER — ANESTHESIA (OUTPATIENT)
Dept: SURGERY | Facility: MEDICAL CENTER | Age: 29
End: 2019-04-04
Payer: COMMERCIAL

## 2019-04-04 VITALS
RESPIRATION RATE: 14 BRPM | OXYGEN SATURATION: 97 % | BODY MASS INDEX: 20.58 KG/M2 | HEART RATE: 89 BPM | HEIGHT: 68 IN | SYSTOLIC BLOOD PRESSURE: 144 MMHG | TEMPERATURE: 97.7 F | WEIGHT: 135.8 LBS | DIASTOLIC BLOOD PRESSURE: 95 MMHG

## 2019-04-04 PROBLEM — S62.91XA: Status: ACTIVE | Noted: 2019-04-04

## 2019-04-04 LAB — HCG SERPL QL: NEGATIVE

## 2019-04-04 PROCEDURE — 700101 HCHG RX REV CODE 250

## 2019-04-04 PROCEDURE — 700111 HCHG RX REV CODE 636 W/ 250 OVERRIDE (IP)

## 2019-04-04 PROCEDURE — 160002 HCHG RECOVERY MINUTES (STAT): Performed by: ORTHOPAEDIC SURGERY

## 2019-04-04 PROCEDURE — 700111 HCHG RX REV CODE 636 W/ 250 OVERRIDE (IP): Performed by: ANESTHESIOLOGY

## 2019-04-04 PROCEDURE — 700102 HCHG RX REV CODE 250 W/ 637 OVERRIDE(OP)

## 2019-04-04 PROCEDURE — 502576 HCHG PACK, HAND: Performed by: ORTHOPAEDIC SURGERY

## 2019-04-04 PROCEDURE — A9270 NON-COVERED ITEM OR SERVICE: HCPCS | Performed by: ANESTHESIOLOGY

## 2019-04-04 PROCEDURE — 160048 HCHG OR STATISTICAL LEVEL 1-5: Performed by: ORTHOPAEDIC SURGERY

## 2019-04-04 PROCEDURE — 160039 HCHG SURGERY MINUTES - EA ADDL 1 MIN LEVEL 3: Performed by: ORTHOPAEDIC SURGERY

## 2019-04-04 PROCEDURE — 700102 HCHG RX REV CODE 250 W/ 637 OVERRIDE(OP): Performed by: ANESTHESIOLOGY

## 2019-04-04 PROCEDURE — 160025 RECOVERY II MINUTES (STATS): Performed by: ORTHOPAEDIC SURGERY

## 2019-04-04 PROCEDURE — 501838 HCHG SUTURE GENERAL: Performed by: ORTHOPAEDIC SURGERY

## 2019-04-04 PROCEDURE — 160028 HCHG SURGERY MINUTES - 1ST 30 MINS LEVEL 3: Performed by: ORTHOPAEDIC SURGERY

## 2019-04-04 PROCEDURE — 700101 HCHG RX REV CODE 250: Performed by: ANESTHESIOLOGY

## 2019-04-04 PROCEDURE — 160046 HCHG PACU - 1ST 60 MINS PHASE II: Performed by: ORTHOPAEDIC SURGERY

## 2019-04-04 PROCEDURE — 700101 HCHG RX REV CODE 250: Performed by: ORTHOPAEDIC SURGERY

## 2019-04-04 PROCEDURE — A6222 GAUZE <=16 IN NO W/SAL W/O B: HCPCS | Performed by: ORTHOPAEDIC SURGERY

## 2019-04-04 PROCEDURE — 160035 HCHG PACU - 1ST 60 MINS PHASE I: Performed by: ORTHOPAEDIC SURGERY

## 2019-04-04 PROCEDURE — 160009 HCHG ANES TIME/MIN: Performed by: ORTHOPAEDIC SURGERY

## 2019-04-04 PROCEDURE — A9270 NON-COVERED ITEM OR SERVICE: HCPCS

## 2019-04-04 PROCEDURE — C1713 ANCHOR/SCREW BN/BN,TIS/BN: HCPCS | Performed by: ORTHOPAEDIC SURGERY

## 2019-04-04 DEVICE — IMPLANTABLE DEVICE: Type: IMPLANTABLE DEVICE | Status: FUNCTIONAL

## 2019-04-04 DEVICE — WIRE K- SMTH .028 4 (6TX6=36) (6EA/PK): Type: IMPLANTABLE DEVICE | Status: FUNCTIONAL

## 2019-04-04 RX ORDER — ACETAMINOPHEN 500 MG
1000 TABLET ORAL ONCE
Status: COMPLETED | OUTPATIENT
Start: 2019-04-04 | End: 2019-04-04

## 2019-04-04 RX ORDER — HALOPERIDOL 5 MG/ML
1 INJECTION INTRAMUSCULAR
Status: DISCONTINUED | OUTPATIENT
Start: 2019-04-04 | End: 2019-04-04 | Stop reason: HOSPADM

## 2019-04-04 RX ORDER — SODIUM CHLORIDE, SODIUM LACTATE, POTASSIUM CHLORIDE, CALCIUM CHLORIDE 600; 310; 30; 20 MG/100ML; MG/100ML; MG/100ML; MG/100ML
INJECTION, SOLUTION INTRAVENOUS CONTINUOUS
Status: DISCONTINUED | OUTPATIENT
Start: 2019-04-04 | End: 2019-04-04 | Stop reason: HOSPADM

## 2019-04-04 RX ORDER — ONDANSETRON 2 MG/ML
INJECTION INTRAMUSCULAR; INTRAVENOUS PRN
Status: DISCONTINUED | OUTPATIENT
Start: 2019-04-04 | End: 2019-04-04 | Stop reason: SURG

## 2019-04-04 RX ORDER — DIPHENHYDRAMINE HYDROCHLORIDE 50 MG/ML
12.5 INJECTION INTRAMUSCULAR; INTRAVENOUS
Status: DISCONTINUED | OUTPATIENT
Start: 2019-04-04 | End: 2019-04-04 | Stop reason: HOSPADM

## 2019-04-04 RX ORDER — ONDANSETRON 2 MG/ML
4 INJECTION INTRAMUSCULAR; INTRAVENOUS
Status: DISCONTINUED | OUTPATIENT
Start: 2019-04-04 | End: 2019-04-04 | Stop reason: HOSPADM

## 2019-04-04 RX ORDER — OXYCODONE HCL 5 MG/5 ML
5 SOLUTION, ORAL ORAL
Status: COMPLETED | OUTPATIENT
Start: 2019-04-04 | End: 2019-04-04

## 2019-04-04 RX ORDER — OXYCODONE HCL 5 MG/5 ML
SOLUTION, ORAL ORAL
Status: COMPLETED
Start: 2019-04-04 | End: 2019-04-04

## 2019-04-04 RX ORDER — CELECOXIB 200 MG/1
200 CAPSULE ORAL ONCE
Status: DISCONTINUED | OUTPATIENT
Start: 2019-04-04 | End: 2019-04-04

## 2019-04-04 RX ORDER — GABAPENTIN 300 MG/1
300 CAPSULE ORAL ONCE
Status: COMPLETED | OUTPATIENT
Start: 2019-04-04 | End: 2019-04-04

## 2019-04-04 RX ORDER — MIDAZOLAM HYDROCHLORIDE 1 MG/ML
1 INJECTION INTRAMUSCULAR; INTRAVENOUS
Status: DISCONTINUED | OUTPATIENT
Start: 2019-04-04 | End: 2019-04-04 | Stop reason: HOSPADM

## 2019-04-04 RX ORDER — OXYCODONE HCL 5 MG/5 ML
10 SOLUTION, ORAL ORAL
Status: COMPLETED | OUTPATIENT
Start: 2019-04-04 | End: 2019-04-04

## 2019-04-04 RX ORDER — DEXAMETHASONE SODIUM PHOSPHATE 4 MG/ML
INJECTION, SOLUTION INTRA-ARTICULAR; INTRALESIONAL; INTRAMUSCULAR; INTRAVENOUS; SOFT TISSUE PRN
Status: DISCONTINUED | OUTPATIENT
Start: 2019-04-04 | End: 2019-04-04 | Stop reason: SURG

## 2019-04-04 RX ORDER — BUPIVACAINE HYDROCHLORIDE 5 MG/ML
INJECTION, SOLUTION EPIDURAL; INTRACAUDAL
Status: DISCONTINUED | OUTPATIENT
Start: 2019-04-04 | End: 2019-04-04 | Stop reason: HOSPADM

## 2019-04-04 RX ORDER — MEPERIDINE HYDROCHLORIDE 50 MG/ML
6.25 INJECTION INTRAMUSCULAR; INTRAVENOUS; SUBCUTANEOUS
Status: DISCONTINUED | OUTPATIENT
Start: 2019-04-04 | End: 2019-04-04 | Stop reason: HOSPADM

## 2019-04-04 RX ORDER — CEFAZOLIN SODIUM 1 G/3ML
INJECTION, POWDER, FOR SOLUTION INTRAMUSCULAR; INTRAVENOUS PRN
Status: DISCONTINUED | OUTPATIENT
Start: 2019-04-04 | End: 2019-04-04 | Stop reason: SURG

## 2019-04-04 RX ADMIN — FENTANYL CITRATE 25 MCG: 50 INJECTION, SOLUTION INTRAMUSCULAR; INTRAVENOUS at 16:07

## 2019-04-04 RX ADMIN — MIDAZOLAM HYDROCHLORIDE 2 MG: 1 INJECTION, SOLUTION INTRAMUSCULAR; INTRAVENOUS at 13:17

## 2019-04-04 RX ADMIN — LIDOCAINE HYDROCHLORIDE 0.5 ML: 10 INJECTION, SOLUTION INFILTRATION; PERINEURAL at 12:28

## 2019-04-04 RX ADMIN — OXYCODONE HYDROCHLORIDE 5 MG: 5 SOLUTION ORAL at 15:57

## 2019-04-04 RX ADMIN — FENTANYL CITRATE 25 MCG: 50 INJECTION, SOLUTION INTRAMUSCULAR; INTRAVENOUS at 16:19

## 2019-04-04 RX ADMIN — GABAPENTIN 300 MG: 300 CAPSULE ORAL at 12:27

## 2019-04-04 RX ADMIN — ACETAMINOPHEN 1000 MG: 500 TABLET, COATED ORAL at 12:26

## 2019-04-04 RX ADMIN — FENTANYL CITRATE 25 MCG: 50 INJECTION, SOLUTION INTRAMUSCULAR; INTRAVENOUS at 16:12

## 2019-04-04 RX ADMIN — SODIUM CHLORIDE, SODIUM LACTATE, POTASSIUM CHLORIDE, CALCIUM CHLORIDE: 600; 310; 30; 20 INJECTION, SOLUTION INTRAVENOUS at 13:10

## 2019-04-04 RX ADMIN — SODIUM CHLORIDE, SODIUM LACTATE, POTASSIUM CHLORIDE, CALCIUM CHLORIDE: 600; 310; 30; 20 INJECTION, SOLUTION INTRAVENOUS at 12:35

## 2019-04-04 RX ADMIN — CEFAZOLIN 2 G: 1 INJECTION, POWDER, FOR SOLUTION INTRAVENOUS at 13:17

## 2019-04-04 RX ADMIN — OXYCODONE HYDROCHLORIDE 5 MG: 5 SOLUTION ORAL at 16:27

## 2019-04-04 RX ADMIN — ONDANSETRON 4 MG: 2 INJECTION INTRAMUSCULAR; INTRAVENOUS at 13:17

## 2019-04-04 RX ADMIN — FENTANYL CITRATE 100 MCG: 50 INJECTION, SOLUTION INTRAMUSCULAR; INTRAVENOUS at 13:17

## 2019-04-04 RX ADMIN — DEXAMETHASONE SODIUM PHOSPHATE 4 MG: 4 INJECTION, SOLUTION INTRAMUSCULAR; INTRAVENOUS at 13:17

## 2019-04-04 RX ADMIN — PROPOFOL 110 MG: 10 INJECTION, EMULSION INTRAVENOUS at 13:17

## 2019-04-04 RX ADMIN — FENTANYL CITRATE 25 MCG: 50 INJECTION, SOLUTION INTRAMUSCULAR; INTRAVENOUS at 16:27

## 2019-04-04 RX ADMIN — SODIUM CHLORIDE, SODIUM LACTATE, POTASSIUM CHLORIDE, CALCIUM CHLORIDE: 600; 310; 30; 20 INJECTION, SOLUTION INTRAVENOUS at 14:45

## 2019-04-04 RX ADMIN — SODIUM CHLORIDE, SODIUM LACTATE, POTASSIUM CHLORIDE, CALCIUM CHLORIDE: 600; 310; 30; 20 INJECTION, SOLUTION INTRAVENOUS at 14:00

## 2019-04-04 RX ADMIN — FENTANYL CITRATE 25 MCG: 50 INJECTION, SOLUTION INTRAMUSCULAR; INTRAVENOUS at 15:59

## 2019-04-04 ASSESSMENT — PAIN SCALES - GENERAL: PAIN_LEVEL: 5

## 2019-04-04 NOTE — ANESTHESIA QCDR
2019 [facility] Clinical Data Registry (for Quality Improvement)     Postoperative nausea/vomiting risk protocol (Adult = 18 yrs and Pediatric 3-17 yrs)- (430 and 463)  General inhalation anesthetic (NOT TIVA) with PONV risk factors: Yes  Provision of anti-emetic therapy with at least 2 different classes of agents: Yes   Patient DID NOT receive anti-emetic therapy and reason is documented in Medical Record:  N/A    Multimodal Pain Management- (AQI59)  Patient undergoing Elective Surgery (i.e. Outpatient, or ASC, or Prescheduled Surgery prior to Hospital Admission): Yes  Use of Multimodal Pain Management, two or more drugs and/or interventions, NOT including systemic opioids: Yes   Exception: Documented allergy to multiple classes of analgesics:  N/A    PACU assessment of acute postoperative pain prior to Anesthesia Care End- Applies to Patients Age = 18- (ABG7)  Initial PACU pain score is which of the following: < 7/10  Patient unable to report pain score: N/A    Post-anesthetic transfer of care checklist/protocol to PACU/ICU- (426 and 427)  Upon conclusion of case, patient transferred to which of the following locations: PACU/Non-ICU  Use of transfer checklist/protocol: Yes  Exclusion: Service Performed in Patient Hospital Room (and thus did not require transfer): N/A    PACU Reintubation- (AQI31)  General anesthesia requiring endotracheal intubation (ETT) along with subsequent extubation in OR or PACU: No  Required reintubation in the PACU: N/A  Extubation was a planned trial documented in the medical record prior to removal of the original airway device: N/A    Unplanned admission to ICU related to anesthesia service up through end of PACU care- (MD51)  Unplanned admission to ICU (not initially anticipated at anesthesia start time): No

## 2019-04-04 NOTE — OR NURSING
Patient to preop, allergies and NPO status verified, home medications reconciled, belongings secured, verbalizes understanding of pain scale, surgical site verified, IV access established, SCDs in place, triple aim completed.

## 2019-04-04 NOTE — ANESTHESIA PREPROCEDURE EVALUATION
Relevant Problems   No relevant active problems       Physical Exam    Airway   Mallampati: II  TM distance: >3 FB  Neck ROM: full       Cardiovascular - normal exam  Rhythm: regular  Rate: normal  (-) murmur     Dental - normal exam         Pulmonary - normal exam  Breath sounds clear to auscultation     Abdominal    Neurological - normal exam                 Anesthesia Plan    ASA 2       Plan - general       Airway plan will be LMA        Induction: intravenous    Postoperative Plan: Postoperative administration of opioids is intended.    Pertinent diagnostic labs and testing reviewed    Informed Consent:    Anesthetic plan and risks discussed with patient.    Use of blood products discussed with: patient whom consented to blood products.

## 2019-04-04 NOTE — ANESTHESIA POSTPROCEDURE EVALUATION
Patient: Susan Garcia    Procedure Summary     Date:  04/04/19 Room / Location:   OR 04 / SURGERY [facility]    Anesthesia Start:  1310 Anesthesia Stop:  1555    Procedure:  ORIF, FINGER- middle, ring, small (Left Finger) Diagnosis:  (NONDISPLACED FRACTURE OF PROXIMAL PHALANX OF LEFT LITTLE FINGER)    Surgeon:  Nas Merino M.D. Responsible Provider:  Lei Leblanc M.D.    Anesthesia Type:  general ASA Status:  2          Final Anesthesia Type: general  Last vitals  BP   Blood Pressure: 140/84    Temp   37.5 °C (99.5 °F)    Pulse   Heart Rate (Monitored): 78   Resp   16    SpO2   98 %      Anesthesia Post Evaluation    Patient location during evaluation: PACU  Patient participation: complete - patient participated  Level of consciousness: awake and alert  Pain score: 5    Airway patency: patent  Anesthetic complications: no  Cardiovascular status: hemodynamically stable  Respiratory status: acceptable  Hydration status: euvolemic    PONV: none           Nurse Pain Score: 3 (NPRS)

## 2019-04-04 NOTE — ANESTHESIA TIME REPORT
Anesthesia Start and Stop Event Times     Date Time Event    4/4/2019 1310 Anesthesia Start     1555 Anesthesia Stop        Responsible Staff  04/04/19    Name Role Begin End    Florencio May M.D. Anesth 1310 1500    Lei Leblanc M.D. Anesth 1500 1555        Preop Diagnosis (Free Text):  Pre-op Diagnosis     NONDISPLACED FRACTURE OF PROXIMAL PHALANX OF LEFT LITTLE FINGER        Preop Diagnosis (Codes):  Diagnosis Information     Diagnosis Code(s):         Post op Diagnosis  Phalanx, multiple sites fracture of hand      Premium Reason  A. 3PM - 7AM    Comments:

## 2019-04-04 NOTE — ANESTHESIA PROCEDURE NOTES
Airway  Date/Time: 4/4/2019 1:18 PM  Performed by: SERGIO JACKSON  Authorized by: SERGIO JACKSON     Location:  OR  Urgency:  Elective  Indications for Airway Management:  Anesthesia  Spontaneous Ventilation: absent    Sedation Level:  Deep  Preoxygenated: Yes    Final Airway Type:  Supraglottic airway  Final Supraglottic Airway:  Standard LMA  SGA Size:  4  Cuff Pressure (cm H2O):  13  Number of Attempts at Approach:  1

## 2019-04-04 NOTE — OR SURGEON
Immediate Post OP Note    PreOp Diagnosis: L sf, rf, mf prox. Phalanx fractures    PostOp Diagnosis: same    Procedure(s):  PINNING, FRACTURE, PERCUTANEOUS- MIDDLE, RING, SMALL VS   ORIF, FINGER    Surgeon(s):  Nas Merino M.D.    Anesthesiologist/Type of Anesthesia:  Anesthesiologist: Florencio May M.D./* No anesthesia type entered *    Surgical Staff:  Circulator: Gini Marino R.N.  Scrub Person: Jm Michelle    Specimens removed if any:  * No specimens in log *    Estimated Blood Loss: min    Findings: adequate reduction    Complications: none        4/4/2019 1:08 PM Nas Merino M.D.

## 2019-04-04 NOTE — DISCHARGE INSTRUCTIONS
ACTIVITY: Rest and take it easy for the first 24 hours.  A responsible adult is recommended to remain with you during that time.  It is normal to feel sleepy.  We encourage you to not do anything that requires balance, judgment or coordination.    MILD FLU-LIKE SYMPTOMS ARE NORMAL. YOU MAY EXPERIENCE GENERALIZED MUSCLE ACHES, THROAT IRRITATION, HEADACHE AND/OR SOME NAUSEA.    FOR 24 HOURS DO NOT:  Drive, operate machinery or run household appliances.  Drink beer or alcoholic beverages.   Make important decisions or sign legal documents.    SPECIAL INSTRUCTIONS:     Keep dressing clean and dry   Elevate extremity   Keep dressing on until next appointment   Encourage moving all fingers    DIET: To avoid nausea, slowly advance diet as tolerated, avoiding spicy or greasy foods for the first day.  Add more substantial food to your diet according to your physician's instructions.  Babies can be fed formula or breast milk as soon as they are hungry.  INCREASE FLUIDS AND FIBER TO AVOID CONSTIPATION.    SURGICAL DRESSING/BATHING: Keep dressing clean, dry and intact until instructed otherwise by surgeon    FOLLOW-UP APPOINTMENT:  A follow-up appointment should be arranged with your doctor ; call to schedule.    You should CALL YOUR PHYSICIAN if you develop:  Fever greater than 101 degrees F.  Pain not relieved by medication, or persistent nausea or vomiting.  Excessive bleeding (blood soaking through dressing) or unexpected drainage from the wound.  Extreme redness or swelling around the incision site, drainage of pus or foul smelling drainage.  Inability to urinate or empty your bladder within 8 hours.  Problems with breathing or chest pain.    You should call 911 if you develop problems with breathing or chest pain.  If you are unable to contact your doctor or surgical center, you should go to the nearest emergency room or urgent care center.        Physician's telephone #: Dr. Merino [phone]    If any questions arise,  call your doctor.  If your doctor is not available, please feel free to call the Surgical Center at [phone].  The Center is open Monday through Friday from 7AM to 7PM.  You can also call the HEALTH HOTLINE open 24 hours/day, 7 days/week and speak to a nurse at [phone], or toll free at [phone].    A registered nurse may call you a few days after your surgery to see how you are doing after your procedure.    MEDICATIONS: Resume taking daily medication.  Take prescribed pain medication with food.  If no medication is prescribed, you may take non-aspirin pain medication if needed.  PAIN MEDICATION CAN BE VERY CONSTIPATING.  Take a stool softener or laxative such as senokot, pericolace, or milk of magnesia if needed.    Prescription given for Norco.  Last pain medication given at 4:30 pm .    If your physician has prescribed pain medication that includes Acetaminophen (Tylenol), do not take additional Acetaminophen (Tylenol) while taking the prescribed medication.    Depression / Suicide Risk    As you are discharged from this [facility] facility, it is important to learn how to keep safe from harming yourself.    Recognize the warning signs:  · Abrupt changes in personality, positive or negative- including increase in energy   · Giving away possessions  · Change in eating patterns- significant weight changes-  positive or negative  · Change in sleeping patterns- unable to sleep or sleeping all the time   · Unwillingness or inability to communicate  · Depression  · Unusual sadness, discouragement and loneliness  · Talk of wanting to die  · Neglect of personal appearance   · Rebelliousness- reckless behavior  · Withdrawal from people/activities they love  · Confusion- inability to concentrate     If you or a loved one observes any of these behaviors or has concerns about self-harm, here's what you can do:  · Talk about it- your feelings and reasons for harming yourself  · Remove any means that you  might use to hurt yourself (examples: pills, rope, extension cords, firearm)  · Get professional help from the community (Mental Health, Substance Abuse, psychological counseling)  · Do not be alone:Call your Safe Contact- someone whom you trust who will be there for you.  · Call your local CRISIS HOTLINE 917-4099 or 022-689-4956  · Call your local Children's Mobile Crisis Response Team [place] (600) 584-1513 or www.HCDC  · Call the toll free National Suicide Prevention Hotlines   · National Suicide Prevention Lifeline 712-039-AZWB (8962)  · National Hope Line Network 800-SUICIDE (375-1503)

## 2019-04-04 NOTE — OR NURSING
1553 To PACU from OR via gurney, respirations spontaneous and non-labored. VSS. Icepack applied over c/d/i left hand surgical dressings. Cap refill to left fingers < 3 seconds and visible fingers warm and pink   1555 Pt crying, states pain is 6/10. VSS. Plan to medicate. See MAR   1620 Pt states pain remains 6/10 and denies nausea. Plan to continue to medicate. See MAR   1644 Pt meets criteria for stage two at this time. VSS. Pt states pain is 6/10 and tolerable and denies nausea.

## 2019-04-05 NOTE — OR NURSING
1644-Patient to stage II area from PACU. No distress noted at this time. Patient assisted with dressing by nursing staff and resting to chair.     1650- Patient family at chair side.     1700- Patient resting in chair. Discharge instructions for home discussed with patient and family. Patient wanting to rest in chair prior to discharge to home and patient advised to rest by nurse.    1730- Patient discharged to awaiting vehicle via wheelchair without incident.

## 2019-04-11 ENCOUNTER — HOSPITAL ENCOUNTER (OUTPATIENT)
Dept: RADIOLOGY | Facility: MEDICAL CENTER | Age: 29
End: 2019-04-11
Attending: PHYSICIAN ASSISTANT
Payer: COMMERCIAL

## 2019-04-11 ENCOUNTER — OFFICE VISIT (OUTPATIENT)
Dept: URGENT CARE | Facility: PHYSICIAN GROUP | Age: 29
End: 2019-04-11
Payer: COMMERCIAL

## 2019-04-11 VITALS
HEIGHT: 68 IN | OXYGEN SATURATION: 100 % | SYSTOLIC BLOOD PRESSURE: 116 MMHG | RESPIRATION RATE: 14 BRPM | HEART RATE: 77 BPM | DIASTOLIC BLOOD PRESSURE: 82 MMHG | BODY MASS INDEX: 20.61 KG/M2 | TEMPERATURE: 98.5 F | WEIGHT: 136 LBS

## 2019-04-11 DIAGNOSIS — R50.9 FEVER, UNSPECIFIED FEVER CAUSE: ICD-10-CM

## 2019-04-11 DIAGNOSIS — R42 DIZZY: ICD-10-CM

## 2019-04-11 DIAGNOSIS — R11.0 NAUSEA: ICD-10-CM

## 2019-04-11 DIAGNOSIS — R11.0 NAUSEA: Primary | ICD-10-CM

## 2019-04-11 DIAGNOSIS — Z98.890 STATUS POST SURGERY: ICD-10-CM

## 2019-04-11 LAB
APPEARANCE UR: CLEAR
BILIRUB UR STRIP-MCNC: NEGATIVE MG/DL
COLOR UR AUTO: YELLOW
GLUCOSE UR STRIP.AUTO-MCNC: NEGATIVE MG/DL
KETONES UR STRIP.AUTO-MCNC: NEGATIVE MG/DL
LEUKOCYTE ESTERASE UR QL STRIP.AUTO: NEGATIVE
NITRITE UR QL STRIP.AUTO: NEGATIVE
PH UR STRIP.AUTO: 6 [PH] (ref 5–8)
PROT UR QL STRIP: NEGATIVE MG/DL
RBC UR QL AUTO: NEGATIVE
SP GR UR STRIP.AUTO: 1.02
UROBILINOGEN UR STRIP-MCNC: 0.2 MG/DL

## 2019-04-11 PROCEDURE — 99214 OFFICE O/P EST MOD 30 MIN: CPT | Performed by: PHYSICIAN ASSISTANT

## 2019-04-11 PROCEDURE — 71046 X-RAY EXAM CHEST 2 VIEWS: CPT

## 2019-04-11 PROCEDURE — 81002 URINALYSIS NONAUTO W/O SCOPE: CPT | Performed by: PHYSICIAN ASSISTANT

## 2019-04-11 NOTE — PROGRESS NOTES
Subjective:      Susan Garcia is a 28 y.o. female who presents with Post-op Problem (L hand surgery, scres placed in 3 fingers, Nausea, dizzy, fever, chills)    PMH:  has a past medical history of ASTHMA (04/2019) and Heart burn.  MEDS:   Current Outpatient Prescriptions:   •  ondansetron (ZOFRAN) 8 MG Tab, Take 1 Tab by mouth every 8 hours as needed for Nausea/Vomiting., Disp: 15 Tab, Rfl: 0  •  levonorgestrel-ethinyl estradiol (ANNY-28) 0.15-30 MG-MCG per tablet, Take 1 Tab by mouth every day., Disp: 84 Tab, Rfl: 1  ALLERGIES:   Allergies   Allergen Reactions   • Sulfa Drugs Unspecified     Last reaction was when she was a child. Has always stayed away from sulfa.     SURGHX:   Past Surgical History:   Procedure Laterality Date   • FINGER ORIF Left 4/4/2019    Procedure: ORIF, FINGER- middle, ring, small;  Surgeon: Nas Merino M.D.;  Location: SURGERY [facility];  Service: Orthopedics   • TONSILLECTOMY  1994     SOCHX:  reports that she has never smoked. She has never used smokeless tobacco. She reports that she drinks alcohol. She reports that she does not use drugs.  FH: Reviewed with patient, not pertinent to this visit.           Patient presents with:  Post-op Problem: L hand surgery, screws placed in 3 fingers, 7 days ago.  Now pt co nausea, dizziness, fever chills since this morning .  Pt states she has not called her surgeons office, she came here instead.  PT denies erythema, swelling or discharge from her surgical wounds.  (surgery was to repair multiple finger fractures).  Denies cough, congestion, SOB, chest pain, palpitations, dysuria, hematuria.        Fever    This is a new problem. The current episode started yesterday. The problem occurs constantly. The problem has been unchanged. Her temperature was unmeasured prior to arrival. Associated symptoms include nausea. Pertinent negatives include no chest pain, coughing, headaches, urinary pain or vomiting. She has tried  "acetaminophen and NSAIDs for the symptoms. The treatment provided no relief.       Review of Systems   Constitutional: Positive for fever.   Respiratory: Negative for cough, sputum production and shortness of breath.    Cardiovascular: Negative for chest pain and palpitations.   Gastrointestinal: Positive for nausea. Negative for vomiting.   Genitourinary: Negative.  Negative for dysuria.   Skin: Negative.    Neurological: Positive for dizziness. Negative for headaches.   All other systems reviewed and are negative.         Objective:     /82 (BP Location: Right arm, Patient Position: Sitting, BP Cuff Size: Adult)   Pulse 77   Temp 36.9 °C (98.5 °F) (Temporal)   Resp 14   Ht 1.727 m (5' 8\")   Wt 61.7 kg (136 lb)   LMP 03/02/2019   SpO2 100%   BMI 20.68 kg/m²      Physical Exam   Constitutional: She is oriented to person, place, and time. She appears well-developed and well-nourished. No distress.   HENT:   Head: Normocephalic and atraumatic.   Nose: Nose normal.   Eyes: Pupils are equal, round, and reactive to light. Conjunctivae and EOM are normal.   Neck: Normal range of motion. Neck supple.   Cardiovascular: Normal rate, regular rhythm and normal heart sounds.    Pulmonary/Chest: Effort normal and breath sounds normal.   Abdominal: Soft.   Musculoskeletal: Normal range of motion.        Hands:  Neurological: She is alert and oriented to person, place, and time. Gait normal.   Skin: Skin is warm and dry. Capillary refill takes less than 2 seconds.   Psychiatric: She has a normal mood and affect.   Nursing note and vitals reviewed.       Assessment/Plan:     1. Nausea  POCT Urinalysis    DX-CHEST-2 VIEWS    ondansetron (ZOFRAN) 8 MG Tab   2. Dizzy  POCT Urinalysis    DX-CHEST-2 VIEWS    ondansetron (ZOFRAN) 8 MG Tab   3. Fever, unspecified fever cause  POCT Urinalysis    DX-CHEST-2 VIEWS    ondansetron (ZOFRAN) 8 MG Tab   4. Status post surgery, left hand       PT had neg UA, neg pregnancy and neg " chest xray.    Etiology of pt fever is unclear. Pt was instructed to call her surgeon's office to further evaluate her symptoms.      PT should follow up with PCP in 1-2 days for re-evaluation if symptoms have not improved.  Discussed red flags and reasons to return to UC or ED.  Pt and/or family verbalized understanding of diagnosis and follow up instructions and was offered informational handout on diagnosis.  PT discharged.

## 2019-04-12 RX ORDER — ONDANSETRON HYDROCHLORIDE 8 MG/1
8 TABLET, FILM COATED ORAL EVERY 8 HOURS PRN
Qty: 15 TAB | Refills: 0 | Status: ON HOLD | OUTPATIENT
Start: 2019-04-12 | End: 2022-04-02

## 2019-04-14 DIAGNOSIS — R42 DIZZINESS: ICD-10-CM

## 2019-04-14 ASSESSMENT — ENCOUNTER SYMPTOMS
HEADACHES: 0
FEVER: 1
DIZZINESS: 1
SPUTUM PRODUCTION: 0
PALPITATIONS: 0
VOMITING: 0
SHORTNESS OF BREATH: 0
COUGH: 0
NAUSEA: 1

## 2020-02-16 ENCOUNTER — OFFICE VISIT (OUTPATIENT)
Dept: URGENT CARE | Facility: PHYSICIAN GROUP | Age: 30
End: 2020-02-16
Payer: COMMERCIAL

## 2020-02-16 VITALS
RESPIRATION RATE: 14 BRPM | HEIGHT: 68 IN | DIASTOLIC BLOOD PRESSURE: 62 MMHG | SYSTOLIC BLOOD PRESSURE: 112 MMHG | TEMPERATURE: 97.6 F | HEART RATE: 81 BPM | BODY MASS INDEX: 20.55 KG/M2 | OXYGEN SATURATION: 98 % | WEIGHT: 135.6 LBS

## 2020-02-16 DIAGNOSIS — H65.93 FLUID LEVEL BEHIND TYMPANIC MEMBRANE OF BOTH EARS: ICD-10-CM

## 2020-02-16 DIAGNOSIS — J06.9 VIRAL URI: ICD-10-CM

## 2020-02-16 PROCEDURE — 99214 OFFICE O/P EST MOD 30 MIN: CPT | Performed by: PHYSICIAN ASSISTANT

## 2020-02-16 RX ORDER — FLUTICASONE PROPIONATE 50 MCG
1 SPRAY, SUSPENSION (ML) NASAL DAILY
Qty: 16 G | Refills: 0 | Status: SHIPPED | OUTPATIENT
Start: 2020-02-16

## 2020-02-16 ASSESSMENT — ENCOUNTER SYMPTOMS
WHEEZING: 0
NAUSEA: 0
EYE DISCHARGE: 0
COUGH: 0
FEVER: 0
HEADACHES: 1
SHORTNESS OF BREATH: 0
EYE REDNESS: 0
VOMITING: 0
SORE THROAT: 0
MYALGIAS: 0

## 2020-02-16 NOTE — PROGRESS NOTES
Subjective:      Susan Garcia is a 29 y.o. female who presents with Ringing in Ear (x 1 week) and Cold Symptoms        URI    This is a new problem. Episode onset: x 1 week ago. The problem has been unchanged. There has been no fever. Associated symptoms include congestion, ear pain, headaches (intermittent) and a plugged ear sensation. Pertinent negatives include no chest pain, coughing, nausea, rash, sneezing, sore throat, vomiting or wheezing. She has tried decongestant for the symptoms. The treatment provided mild relief.     PMH:  has a past medical history of ASTHMA (04/2019) and Heart burn.  MEDS:   Current Outpatient Medications:   •  levonorgestrel-ethinyl estradiol (ANNY-28) 0.15-30 MG-MCG per tablet, Take 1 Tab by mouth every day., Disp: 84 Tab, Rfl: 1  •  ondansetron (ZOFRAN) 8 MG Tab, Take 1 Tab by mouth every 8 hours as needed for Nausea/Vomiting. (Patient not taking: Reported on 2/16/2020), Disp: 15 Tab, Rfl: 0  ALLERGIES:   Allergies   Allergen Reactions   • Sulfa Drugs Unspecified     Last reaction was when she was a child. Has always stayed away from sulfa.     SURGHX:   Past Surgical History:   Procedure Laterality Date   • FINGER ORIF Left 4/4/2019    Procedure: ORIF, FINGER- middle, ring, small;  Surgeon: Nas Merino M.D.;  Location: SURGERY [facility];  Service: Orthopedics   • TONSILLECTOMY  1994     SOCHX:  reports that she has never smoked. She has never used smokeless tobacco. She reports current alcohol use. She reports that she does not use drugs.  FH: Family history was reviewed, no pertinent findings to report    Review of Systems   Constitutional: Negative for fever.   HENT: Positive for congestion and ear pain. Negative for sneezing and sore throat.    Eyes: Negative for discharge and redness.   Respiratory: Negative for cough, shortness of breath and wheezing.    Cardiovascular: Negative for chest pain and leg swelling.   Gastrointestinal: Negative for nausea  "and vomiting.   Musculoskeletal: Negative for myalgias.   Skin: Negative for rash.   Neurological: Positive for headaches (intermittent).          Objective:     /62 (BP Location: Left arm, Patient Position: Sitting, BP Cuff Size: Adult)   Pulse 81   Temp 36.4 °C (97.6 °F) (Temporal)   Resp 14   Ht 1.727 m (5' 8\")   Wt 61.5 kg (135 lb 9.6 oz)   SpO2 98%   BMI 20.62 kg/m²      Physical Exam  Constitutional:       General: She is not in acute distress.     Appearance: Normal appearance.   HENT:      Head: Normocephalic and atraumatic.      Right Ear: Ear canal and external ear normal. A middle ear effusion is present. Tympanic membrane is not erythematous or bulging.      Left Ear: Ear canal and external ear normal. A middle ear effusion is present. Tympanic membrane is not erythematous or bulging.      Nose: Nose normal.      Mouth/Throat:      Mouth: Mucous membranes are moist.      Pharynx: Oropharynx is clear. Uvula midline. No posterior oropharyngeal erythema.      Tonsils: No tonsillar exudate.   Eyes:      Extraocular Movements: Extraocular movements intact.      Conjunctiva/sclera: Conjunctivae normal.   Neck:      Musculoskeletal: Normal range of motion and neck supple.   Cardiovascular:      Rate and Rhythm: Normal rate and regular rhythm.      Heart sounds: Normal heart sounds.   Pulmonary:      Effort: Pulmonary effort is normal. No respiratory distress.      Breath sounds: Normal breath sounds. No wheezing.   Musculoskeletal: Normal range of motion.   Skin:     General: Skin is warm and dry.   Neurological:      Mental Status: She is alert and oriented to person, place, and time.                 Assessment/Plan:     1. Viral URI    2. Fluid level behind tympanic membrane of both ears  - fluticasone (FLONASE) 50 MCG/ACT nasal spray; Spray 1 Spray in nose every day.  Dispense: 16 g; Refill: 0    Differential diagnoses, supportive care, and indications for immediate follow-up discussed with " patient.   Instructed to return to clinic or nearest emergency department for any change in condition, further concerns, or worsening of symptoms.    OTC Tylenol or Motrin for fever/discomfort.  OTC cough/cold medication for symptomatic relief  OTC oral decongestant for symptomatic relief  OTC Supportive Care for Congestion - saline nasal spray or neti pot  OTC Supportive Care for Sore Throat - warm salt water gargles, sore throat lozenges, warm lemon water, and/or tea.  Drink plenty of fluids  Follow-up with PCP  Return to clinic or go to the ED if symptoms worsen or fail to improve, or if the patient should develop worsening/increasing cough, congestion, ear pain, sore throat, shortness of breath, wheezing, chest pain, fever/chills, and/or any concerning symptoms.    Discussed plan with the patient, and she agrees to the above.     Please note that this dictation was created using voice recognition software. I have made every reasonable attempt to correct obvious errors, but I expect that there may be errors of grammar and possibly content that I did not discover before finalizing the note.

## 2020-02-22 ENCOUNTER — OFFICE VISIT (OUTPATIENT)
Dept: URGENT CARE | Facility: PHYSICIAN GROUP | Age: 30
End: 2020-02-22
Payer: COMMERCIAL

## 2020-02-22 VITALS
OXYGEN SATURATION: 100 % | BODY MASS INDEX: 20.16 KG/M2 | HEIGHT: 68 IN | WEIGHT: 133 LBS | RESPIRATION RATE: 16 BRPM | SYSTOLIC BLOOD PRESSURE: 126 MMHG | TEMPERATURE: 98.9 F | DIASTOLIC BLOOD PRESSURE: 82 MMHG | HEART RATE: 80 BPM

## 2020-02-22 DIAGNOSIS — H81.10 BENIGN PAROXYSMAL POSITIONAL VERTIGO, UNSPECIFIED LATERALITY: ICD-10-CM

## 2020-02-22 DIAGNOSIS — H93.8X1 CONGESTION OF RIGHT EAR: ICD-10-CM

## 2020-02-22 PROCEDURE — 99214 OFFICE O/P EST MOD 30 MIN: CPT | Performed by: NURSE PRACTITIONER

## 2020-02-22 RX ORDER — AMOXICILLIN AND CLAVULANATE POTASSIUM 875; 125 MG/1; MG/1
1 TABLET, FILM COATED ORAL 2 TIMES DAILY
Status: ON HOLD | COMMUNITY
End: 2022-04-02

## 2020-02-22 ASSESSMENT — ENCOUNTER SYMPTOMS
DIZZINESS: 1
COUGH: 0

## 2020-02-22 NOTE — PROGRESS NOTES
Subjective:      Susan Garcia is a 29 y.o. female who presents with Dizziness (ear pain and ringing x 1 week was seen 1 week ago is not getting better)            Dizziness   This is a new problem. Episode onset: pt reports new onset of dizziness that started one week ago. She states her right ear is also ringing and feels like she is under water. Position changes makes dizziness worse. Lying down makes it better. The problem occurs intermittently. The problem has been unchanged. Pertinent negatives include no congestion or coughing. Associated symptoms comments: She has had vertigo in the past and she did recently try some exercises she was provided with for this episode but it is not helping. Treatments tried: she is still using the nasal spray provided to her from last  visit. The treatment provided no relief.       Review of Systems   HENT: Positive for ear pain (right ear). Negative for congestion.    Respiratory: Negative for cough.    Neurological: Positive for dizziness.   All other systems reviewed and are negative.    Past Medical History:   Diagnosis Date   • ASTHMA 04/2019    exercise, no inhalers   • Heart burn       Past Surgical History:   Procedure Laterality Date   • FINGER ORIF Left 4/4/2019    Procedure: ORIF, FINGER- middle, ring, small;  Surgeon: Nas Merino M.D.;  Location: SURGERY [facility];  Service: Orthopedics   • TONSILLECTOMY  1994      Social History     Socioeconomic History   • Marital status: Single     Spouse name: Not on file   • Number of children: Not on file   • Years of education: Not on file   • Highest education level: Not on file   Occupational History   • Not on file   Social Needs   • Financial resource strain: Not on file   • Food insecurity     Worry: Not on file     Inability: Not on file   • Transportation needs     Medical: Not on file     Non-medical: Not on file   Tobacco Use   • Smoking status: Never Smoker   • Smokeless tobacco: Never Used  "  Substance and Sexual Activity   • Alcohol use: Yes     Alcohol/week: 0.0 oz     Comment: 3 drinks week   • Drug use: No   • Sexual activity: Yes     Partners: Male     Birth control/protection: Pill   Lifestyle   • Physical activity     Days per week: Not on file     Minutes per session: Not on file   • Stress: Not on file   Relationships   • Social connections     Talks on phone: Not on file     Gets together: Not on file     Attends Islam service: Not on file     Active member of club or organization: Not on file     Attends meetings of clubs or organizations: Not on file     Relationship status: Not on file   • Intimate partner violence     Fear of current or ex partner: Not on file     Emotionally abused: Not on file     Physically abused: Not on file     Forced sexual activity: Not on file   Other Topics Concern   • Not on file   Social History Narrative   • Not on file          Objective:     /82 (BP Location: Left arm, Patient Position: Sitting, BP Cuff Size: Adult)   Pulse 80   Temp 37.2 °C (98.9 °F) (Temporal)   Resp 16   Ht 1.727 m (5' 8\")   Wt 60.3 kg (133 lb)   LMP 02/11/2020   SpO2 100%   BMI 20.22 kg/m²      Physical Exam  Vitals signs and nursing note reviewed.   Constitutional:       Appearance: Normal appearance. She is normal weight.   HENT:      Head: Normocephalic and atraumatic.      Right Ear: Tympanic membrane and external ear normal.      Left Ear: Tympanic membrane and external ear normal.      Nose: Nose normal.      Mouth/Throat:      Mouth: Mucous membranes are moist.      Pharynx: Oropharynx is clear.   Eyes:      Extraocular Movements: Extraocular movements intact.      Pupils: Pupils are equal, round, and reactive to light.   Neck:      Musculoskeletal: Normal range of motion.   Cardiovascular:      Rate and Rhythm: Normal rate and regular rhythm.   Pulmonary:      Effort: Pulmonary effort is normal.   Musculoskeletal: Normal range of motion.   Skin:     General: " Skin is warm and dry.      Capillary Refill: Capillary refill takes less than 2 seconds.   Neurological:      General: No focal deficit present.      Mental Status: She is alert and oriented to person, place, and time. Mental status is at baseline.      Cranial Nerves: Cranial nerves are intact.      Sensory: Sensation is intact.      Motor: Motor function is intact.      Coordination: Coordination is intact.      Gait: Gait is intact.   Psychiatric:         Mood and Affect: Mood normal.         Speech: Speech normal.         Thought Content: Thought content normal.         Judgment: Judgment normal.                 Assessment/Plan:       1. Benign paroxysmal positional vertigo, unspecified laterality  - REFERRAL TO PHYSICAL THERAPY Reason for Therapy: Eval/Treat/Report    2. Congestion of right ear    Discussed with patient benign nature of vertigo  Etiology for her unclear at this time  Given that it has been one week w/o improvement, will refer to vestibular therapy  Encouraged pt to keep using flonase  Add in daily OTC claritin  Slow position changes  Rest and hydration  Supportive care, differential diagnoses, and indications for immediate follow-up discussed with patient.    Pathogenesis of diagnosis discussed including typical length and natural progression.      Instructed to return to  or nearest emergency department if symptoms fail to improve, for any change in condition, further concerns, or new concerning symptoms.  Patient states understanding of the plan of care and discharge instructions.

## 2020-02-22 NOTE — PATIENT INSTRUCTIONS
Benign Positional Vertigo  Introduction  Vertigo is the feeling that you or your surroundings are moving when they are not. Benign positional vertigo is the most common form of vertigo. The cause of this condition is not serious (is benign). This condition is triggered by certain movements and positions (is positional). This condition can be dangerous if it occurs while you are doing something that could endanger you or others, such as driving.  What are the causes?  In many cases, the cause of this condition is not known. It may be caused by a disturbance in an area of the inner ear that helps your brain to sense movement and balance. This disturbance can be caused by a viral infection (labyrinthitis), head injury, or repetitive motion.  What increases the risk?  This condition is more likely to develop in:  · Women.  · People who are 50 years of age or older.  What are the signs or symptoms?  Symptoms of this condition usually happen when you move your head or your eyes in different directions. Symptoms may start suddenly, and they usually last for less than a minute. Symptoms may include:  · Loss of balance and falling.  · Feeling like you are spinning or moving.  · Feeling like your surroundings are spinning or moving.  · Nausea and vomiting.  · Blurred vision.  · Dizziness.  · Involuntary eye movement (nystagmus).  Symptoms can be mild and cause only slight annoyance, or they can be severe and interfere with daily life. Episodes of benign positional vertigo may return (recur) over time, and they may be triggered by certain movements. Symptoms may improve over time.  How is this diagnosed?  This condition is usually diagnosed by medical history and a physical exam of the head, neck, and ears. You may be referred to a health care provider who specializes in ear, nose, and throat (ENT) problems (otolaryngologist) or a provider who specializes in disorders of the nervous system (neurologist). You may have  additional testing, including:  · MRI.  · A CT scan.  · Eye movement tests. Your health care provider may ask you to change positions quickly while he or she watches you for symptoms of benign positional vertigo, such as nystagmus. Eye movement may be tested with an electronystagmogram (ENG), caloric stimulation, the Kia-Hallpike test, or the roll test.  · An electroencephalogram (EEG). This records electrical activity in your brain.  · Hearing tests.  How is this treated?  Usually, your health care provider will treat this by moving your head in specific positions to adjust your inner ear back to normal. Surgery may be needed in severe cases, but this is rare. In some cases, benign positional vertigo may resolve on its own in 2-4 weeks.  Follow these instructions at home:  Safety  · Move slowly.Avoid sudden body or head movements.  · Avoid driving.  · Avoid operating heavy machinery.  · Avoid doing any tasks that would be dangerous to you or others if a vertigo episode would occur.  · If you have trouble walking or keeping your balance, try using a cane for stability. If you feel dizzy or unstable, sit down right away.  · Return to your normal activities as told by your health care provider. Ask your health care provider what activities are safe for you.  General instructions  · Take over-the-counter and prescription medicines only as told by your health care provider.  · Avoid certain positions or movements as told by your health care provider.  · Drink enough fluid to keep your urine clear or pale yellow.  · Keep all follow-up visits as told by your health care provider. This is important.  Contact a health care provider if:  · You have a fever.  · Your condition gets worse or you develop new symptoms.  · Your family or friends notice any behavioral changes.  · Your nausea or vomiting gets worse.  · You have numbness or a “pins and needles” sensation.  Get help right away if:  · You have difficulty speaking or  moving.  · You are always dizzy.  · You faint.  · You develop severe headaches.  · You have weakness in your legs or arms.  · You have changes in your hearing or vision.  · You develop a stiff neck.  · You develop sensitivity to light.  This information is not intended to replace advice given to you by your health care provider. Make sure you discuss any questions you have with your health care provider.  Document Released: 09/25/2007 Document Revised: 05/25/2017 Document Reviewed: 04/11/2016  © 2017 [employer]

## 2020-02-24 ENCOUNTER — PATIENT MESSAGE (OUTPATIENT)
Dept: URGENT CARE | Facility: PHYSICIAN GROUP | Age: 30
End: 2020-02-24

## 2020-02-24 NOTE — TELEPHONE ENCOUNTER
From: Susan Garcia  To: ELDER Pritchard  Sent: 2/24/2020 10:02 AM PST  Subject: Procedure Question    Hello Dr. Redmond,     Thank you again for seeing me on Saturday. It was a pleasure to meet you.     I was hoping to inquire about where my referral was sent. I am not sure if you have that information, but I was hoping I could reach out to the PT office and inquire about openings. I know I am supposed to wait for them to contact me, but I am still very miserable and looking to see if this helps.     If you cannot provide the information I completely understand, I just thought it cant hurt to ask.     Thank you again for your time - have a wonderful day!     Ekaterina

## 2020-02-25 ENCOUNTER — TELEPHONE (OUTPATIENT)
Dept: MEDICAL GROUP | Facility: LAB | Age: 30
End: 2020-02-25

## 2020-02-26 ENCOUNTER — PHYSICAL THERAPY (OUTPATIENT)
Dept: PHYSICAL THERAPY | Facility: REHABILITATION | Age: 30
End: 2020-02-26
Attending: NURSE PRACTITIONER
Payer: COMMERCIAL

## 2020-02-26 DIAGNOSIS — R42 DIZZINESS AND GIDDINESS: ICD-10-CM

## 2020-02-26 PROCEDURE — 97535 SELF CARE MNGMENT TRAINING: CPT

## 2020-02-26 PROCEDURE — 97162 PT EVAL MOD COMPLEX 30 MIN: CPT

## 2020-02-26 ASSESSMENT — ENCOUNTER SYMPTOMS
PAIN SCALE: 5
PAIN SCALE AT HIGHEST: 7
PAIN SCALE AT LOWEST: 3
PAIN TIMING: EVERY MORNING

## 2020-02-26 NOTE — OP THERAPY EVALUATION
"  Outpatient Physical Therapy  VESTIBULAR EVALUATION    [address]  [address]  [facility] [address]  Phone:  [phone]  Fax:  [phone]    Date of Evaluation: 02/26/2020    Patient: Ekaterina Garcia  YOB: 1990  MRN: [id number]     Referring Provider: ELDER Pritchard  [id number]  [address]  [place] [address]   Referring Diagnosis: Benign paroxysmal positional vertigo, unspecified laterality [H81.10]     Time Calculation  Start time: 0930  Stop time: 1020 Time Calculation (min): 50 minutes       Physical Therapy Occurrence Codes    Date of onset of impairment:  3/1/19   Date physical therapy care plan established or reviewed:  2/26/20   Date physical therapy treatment started:  2/26/20          Chief Complaint: Vertigo    Visit Diagnoses     ICD-10-CM   1. Dizziness and giddiness R42         History of Present Illness:     Mechanism of injury:  Pt presents to PT with complaint of vertigo.  States her first episode began after surgery for her L hand 4/4/2019.  Began to note lightheadedness, mild room spinning sensation that was happening mainly when she got up quickly or turned the head side to side.  Went to the health center, who gave her what sounds like some basic VRT (head turns EO and EC).  Felt better within a couple days.  Sx returned about 2 weeks ago upon having to be under her house in the crawl space and under a lot of stress. The symptoms are more intense this time.  Lightheaded constantly, vertigo/brief sense of boat-like motion with quick HTs/getting in or out of bed/driving/looking at screens.  \"I feel uncoordinated in my legs, like they aren't connected to my body.\"  Describes a pins and needles/restless leg sx.  Was given an antibiotic for sinus infection, didn't seem to help.  Has been using allergy meds, flonase.   Family history: sister=migraines, mother= vertigo, grandmother= MS.     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  Hobbies: horseback riding, hiking, snow shoeing.     Headaches: no headaches      Ear problems:  Ear ache (fullness, wooshing sensation in the R ear)    Sleep disturbance:  Not disrupted  Symptoms:     Current symptom ratin    At best symptom rating:  3    At worst symptom ratin (nausea, no vomiting)    Symptom timing:  Every morning    Progression:  Worsening  Social Support:     Lives in:  One-story house    Lives with:  Spouse  Treatments:     No prior treatments received      Treatment comments:  Has an appt next week to see an ENT.  Patient goals:     Patient goals for therapy:  Improved balance    Other patient goals:  Resolve vertigo      Past Medical History:   Diagnosis Date   • ASTHMA 2019    exercise, no inhalers   • Heart burn      Past Surgical History:   Procedure Laterality Date   • FINGER ORIF Left 2019    Procedure: ORIF, FINGER- middle, ring, small;  Surgeon: Nas Merino M.D.;  Location: SURGERY [facility];  Service: Orthopedics   • TONSILLECTOMY       Social History     Tobacco Use   • Smoking status: Never Smoker   • Smokeless tobacco: Never Used   Substance Use Topics   • Alcohol use: Yes     Alcohol/week: 0.0 oz     Comment: 3 drinks week     Family and Occupational History     Socioeconomic History   • Marital status:      Spouse name: Not on file   • Number of children: Not on file   • Years of education: Not on file   • Highest education level: Not on file   Occupational History   • Not on file       Objective:    Vestibulospinal Exam:     MCTSIB:         Firm, eyes open: within normal limits        Firm, eyes closed: within normal limits        Foam, eyes open: within normal limits        Foam, eyes closed: within normal limits (symptomatic)        Composite Score: within normal limits      Oculomotor Exam:         Details: No spontaneous nystagmus central gaze          Details: No  spontaneous nystagmus eccentric gaze    No saccadic eye movements (WNL but symptomatic, susan vertical)    Smooth pursuit present (WNL but symptomatic in all directions)    Convergence:        Normal convergence    No skew  Active Range of Motion:     Within functional limits  Limb Ataxia Exam:     Finger-to-Nose:         Intention tremor: none    Dysdiadochokinesia: none.  Strength Exam:     Comments: LEs 5/5 and even throughout.  No evidence of increased tone  Reflex Exam:       Deep Tendon Reflexes:         Left L4 (Patellar): brisk (3+)        Left S1 (Achilles): brisk (3+)        Right L4 (Patellar): brisk (3+)        Right S1 (Achilles): brisk (3+)  Sensation Tests:     Left Light Touch Sensation:         All left lumbar dermatomes intact      Right Light Touch Sensation:         All right lumbar dermatomes intact      Vestibulo-Ocular Exam:   Normal head thrust test    Comments: VORx1 is symptomatic and reproduces rocking sensation  BPPV Exam:     Comments: Positional exam is inconsistent with BPPV, but remarkable for direction changing nystagmus in R hallpike (persistent mainly vertical beat), L hallpike (more mild vertical with slight torsion) and L roll (persistent R torsional).  Nystagmus is present only with gaze blocked, WNL with fixation.    Breathing-Related Tests:     Abnormal hyperventilation test       Positive findings: dizziness       Negative findings: no nystagmus    Normal Valsalva test        Therapeutic Treatments and Modalities:     1. Functional Training, Self Care (CPT 66691), Education: sources of dizziness (central vs peripheral) and presentation.  How to compensate and manage triggers until more specific VRT is indicated. Encouraged to follow up with ENT, return when sx are more stable.     Time-based treatments/modalities:  Functional training, self care minutes (CPT 85098): 20 minutes         Assessment:     Functional impairments:  Decreased gaze stabilization    Other impairments:   Abnormal positional nystagmus    Assessment details:  Mrs. Garcia is a 29 y.o female who presents to PT with complaint of episodic vertigo.  There have been 2 events in the last year, the current one starting 2 weeks ago.  PT evaluation was WNL except for sx reproduction with pursuits/saccades/hyperventillation test and direction changing nystagmus present with R/L hallpike and L roll.  The nystagmus is inconsistent with BPPV, but may be indicative of vestibular neuritis.  Despite a normal reflex, strength, tone exam, does have sensation changes in B LEs and the direction changing pattern can indicate central involvement.  If sx persist, may consider central imaging (family history of MS).  Will hold until follow up with ENT is complete.  Pt would benefit from progressive VRT if sx become more stable and better defined with specialist follow up.     Prognosis: good    Goals:     Short term goals:  - Able to perform 3 way saccades with dizziness less than 4/10  - Able to perform mardsen ball 3 way with sx less than 4/10  - Resolution of nystagmus in positional exam, fixation blocked.     Short term goal time span:  1-2 weeks    Long term goals:  - Improve DHI to less than 15%  - Indep with HEP    Long term goal time span:  6-8 weeks  Plan:     Therapy options:  Physical therapy treatment to continue    Planned therapy interventions:  Functional Training, Self Care (CPT 63982), Therapeutic Activities (CPT 04161), Therapeutic Exercise (CPT 05151) and Neuromuscular Re-education (CPT 25228)    Frequency:  1x week    Duration in weeks:  8    Discussed with:  Patient      Functional Assessment Used  Dizziness Handicap Inventory - Physical Items Score: 26  Dizziness Handicap Inventory - Emotional Items Score: 10  Dizziness Handicap Inventory - Functional Items Score: 18  Dizziness Handicap Inventory - Total Score: 54       Referring provider co-signature:  I have reviewed this plan of care and my co-signature certifies  the need for services.  Certification Dates:   From 02/26/20     To 04/22/20    Physician Signature: ________________________________ Date: ______________

## 2020-03-04 ENCOUNTER — APPOINTMENT (OUTPATIENT)
Dept: PHYSICAL THERAPY | Facility: REHABILITATION | Age: 30
End: 2020-03-04
Attending: NURSE PRACTITIONER
Payer: COMMERCIAL

## 2020-03-09 ENCOUNTER — APPOINTMENT (OUTPATIENT)
Dept: PHYSICAL THERAPY | Facility: REHABILITATION | Age: 30
End: 2020-03-09
Attending: NURSE PRACTITIONER
Payer: COMMERCIAL

## 2020-03-11 ENCOUNTER — APPOINTMENT (OUTPATIENT)
Dept: PHYSICAL THERAPY | Facility: REHABILITATION | Age: 30
End: 2020-03-11
Attending: NURSE PRACTITIONER
Payer: COMMERCIAL

## 2020-03-12 ENCOUNTER — HOSPITAL ENCOUNTER (OUTPATIENT)
Dept: RADIOLOGY | Facility: MEDICAL CENTER | Age: 30
End: 2020-03-12
Attending: OTOLARYNGOLOGY
Payer: COMMERCIAL

## 2020-03-12 DIAGNOSIS — R42 VERTIGO: ICD-10-CM

## 2020-03-12 PROCEDURE — A9576 INJ PROHANCE MULTIPACK: HCPCS | Performed by: OTOLARYNGOLOGY

## 2020-03-12 PROCEDURE — 700117 HCHG RX CONTRAST REV CODE 255: Performed by: OTOLARYNGOLOGY

## 2020-03-12 PROCEDURE — 70553 MRI BRAIN STEM W/O & W/DYE: CPT

## 2020-03-12 RX ADMIN — GADOTERIDOL 13 ML: 279.3 INJECTION, SOLUTION INTRAVENOUS at 13:47

## 2020-03-18 ENCOUNTER — APPOINTMENT (OUTPATIENT)
Dept: PHYSICAL THERAPY | Facility: REHABILITATION | Age: 30
End: 2020-03-18
Attending: NURSE PRACTITIONER
Payer: COMMERCIAL

## 2020-03-20 ENCOUNTER — APPOINTMENT (OUTPATIENT)
Dept: PHYSICAL THERAPY | Facility: REHABILITATION | Age: 30
End: 2020-03-20
Attending: NURSE PRACTITIONER
Payer: COMMERCIAL

## 2020-03-23 ENCOUNTER — APPOINTMENT (OUTPATIENT)
Dept: PHYSICAL THERAPY | Facility: REHABILITATION | Age: 30
End: 2020-03-23
Payer: COMMERCIAL

## 2020-03-25 ENCOUNTER — APPOINTMENT (OUTPATIENT)
Dept: PHYSICAL THERAPY | Facility: REHABILITATION | Age: 30
End: 2020-03-25
Payer: COMMERCIAL

## 2020-03-30 ENCOUNTER — APPOINTMENT (OUTPATIENT)
Dept: PHYSICAL THERAPY | Facility: REHABILITATION | Age: 30
End: 2020-03-30
Payer: COMMERCIAL

## 2020-04-01 ENCOUNTER — APPOINTMENT (OUTPATIENT)
Dept: PHYSICAL THERAPY | Facility: REHABILITATION | Age: 30
End: 2020-04-01
Payer: COMMERCIAL

## 2020-04-01 ENCOUNTER — TELEPHONE (OUTPATIENT)
Dept: PHYSICAL THERAPY | Facility: REHABILITATION | Age: 30
End: 2020-04-01

## 2020-04-01 NOTE — OP THERAPY DISCHARGE SUMMARY
Outpatient Physical Therapy  DISCHARGE SUMMARY NOTE      [facility] [address].  [address]  [place] [address]  Phone:  [phone]  Fax:  [phone]    Date of Visit: 04/01/2020    Patient: Ekaterina Garcia  YOB: 1990  MRN: [id number]     Referring Provider: No referring provider defined for this encounter.   Referring Diagnosis No admission diagnoses are documented for this encounter.     Physical Therapy Occurrence Codes    Date of onset of impairment:  3/1/19   Date physical therapy care plan established or reviewed:  2/26/20   Date physical therapy treatment started:  2/26/20          Functional Assessment Used        Your patient is being discharged from Physical Therapy with the following comments:   · other    Comments:  Ms. Garcia was seen only for eval of her dizziness.  There were sx consistent with vestibular neuritis, but some sx concerning for a central source.  Referred back to MD for central imaging, which has now come back WNL except for sinusitis.  Being referred to ENT.  Will d/c from PT at this time, but may return with new Rx if indicated after her ENT eval.     Tika Pinzon, PT, DPT    Date: 4/1/2020

## 2020-06-26 ENCOUNTER — OFFICE VISIT (OUTPATIENT)
Dept: URGENT CARE | Facility: PHYSICIAN GROUP | Age: 30
End: 2020-06-26
Payer: COMMERCIAL

## 2020-06-26 ENCOUNTER — HOSPITAL ENCOUNTER (OUTPATIENT)
Facility: MEDICAL CENTER | Age: 30
End: 2020-06-26
Attending: NURSE PRACTITIONER
Payer: COMMERCIAL

## 2020-06-26 VITALS
TEMPERATURE: 99.3 F | SYSTOLIC BLOOD PRESSURE: 120 MMHG | RESPIRATION RATE: 16 BRPM | HEIGHT: 68 IN | HEART RATE: 72 BPM | OXYGEN SATURATION: 97 % | WEIGHT: 135 LBS | BODY MASS INDEX: 20.46 KG/M2 | DIASTOLIC BLOOD PRESSURE: 82 MMHG

## 2020-06-26 DIAGNOSIS — Z20.822 SUSPECTED COVID-19 VIRUS INFECTION: ICD-10-CM

## 2020-06-26 DIAGNOSIS — R68.89 FLU-LIKE SYMPTOMS: ICD-10-CM

## 2020-06-26 DIAGNOSIS — J02.9 PHARYNGITIS, UNSPECIFIED ETIOLOGY: ICD-10-CM

## 2020-06-26 LAB
COVID ORDER STATUS COVID19: NORMAL
FLUAV+FLUBV AG SPEC QL IA: NORMAL
INT CON NEG: NEGATIVE
INT CON POS: POSITIVE

## 2020-06-26 PROCEDURE — U0003 INFECTIOUS AGENT DETECTION BY NUCLEIC ACID (DNA OR RNA); SEVERE ACUTE RESPIRATORY SYNDROME CORONAVIRUS 2 (SARS-COV-2) (CORONAVIRUS DISEASE [COVID-19]), AMPLIFIED PROBE TECHNIQUE, MAKING USE OF HIGH THROUGHPUT TECHNOLOGIES AS DESCRIBED BY CMS-2020-01-R: HCPCS

## 2020-06-26 PROCEDURE — 99214 OFFICE O/P EST MOD 30 MIN: CPT | Performed by: NURSE PRACTITIONER

## 2020-06-26 PROCEDURE — 87804 INFLUENZA ASSAY W/OPTIC: CPT | Performed by: NURSE PRACTITIONER

## 2020-06-26 RX ORDER — DEXAMETHASONE SODIUM PHOSPHATE 10 MG/ML
10 INJECTION INTRAMUSCULAR; INTRAVENOUS ONCE
Status: COMPLETED | OUTPATIENT
Start: 2020-06-26 | End: 2020-06-26

## 2020-06-26 RX ADMIN — DEXAMETHASONE SODIUM PHOSPHATE 10 MG: 10 INJECTION INTRAMUSCULAR; INTRAVENOUS at 16:17

## 2020-06-26 ASSESSMENT — ENCOUNTER SYMPTOMS
NECK PAIN: 0
COUGH: 0
CHILLS: 0
PALPITATIONS: 0
SORE THROAT: 1
SPUTUM PRODUCTION: 0
SHORTNESS OF BREATH: 0
DIARRHEA: 0
VOMITING: 0
FEVER: 0
ABDOMINAL PAIN: 0
HEADACHES: 1
SWOLLEN GLANDS: 0
WHEEZING: 0

## 2020-06-26 NOTE — PROGRESS NOTES
Subjective:     Susan Garcia is a 30 y.o. female who presents for Pharyngitis (fatigue, congestion yesterday, denies fever, mom tested + for covid)      Pharyngitis    This is a new problem. The current episode started in the past 7 days (3 days ago she developed a sore throat, body aches, headache and fatigue. She was tested for strep yesterday however, this was negative. Her mom tested positive for COVID 2 days ago with whom she had recent contact with. ). The problem has been gradually worsening. The pain is worse on the left side. There has been no fever. The pain is at a severity of 5/10. Associated symptoms include ear pain and headaches. Pertinent negatives include no abdominal pain, congestion, coughing, diarrhea, neck pain, shortness of breath, swollen glands or vomiting. Associated symptoms comments: Right ear intermittent pain. Exposure to: COVID. She has tried nothing for the symptoms.       Review of Systems   Constitutional: Positive for malaise/fatigue. Negative for chills and fever.   HENT: Positive for ear pain and sore throat. Negative for congestion.    Respiratory: Negative for cough, sputum production, shortness of breath and wheezing.    Cardiovascular: Negative for chest pain and palpitations.   Gastrointestinal: Negative for abdominal pain, diarrhea and vomiting.   Musculoskeletal: Negative for neck pain.   Neurological: Positive for headaches.       PMH:   Past Medical History:   Diagnosis Date   • ASTHMA 04/2019    exercise, no inhalers   • Heart burn      ALLERGIES:   Allergies   Allergen Reactions   • Sulfa Drugs Unspecified     Last reaction was when she was a child. Has always stayed away from sulfa.     SURGHX:   Past Surgical History:   Procedure Laterality Date   • FINGER ORIF Left 4/4/2019    Procedure: ORIF, FINGER- middle, ring, small;  Surgeon: Nas Merino M.D.;  Location: SURGERY [facility];  Service: Orthopedics   • TONSILLECTOMY  1994     SOCHX:   Social  "History     Socioeconomic History   • Marital status:      Spouse name: Not on file   • Number of children: Not on file   • Years of education: Not on file   • Highest education level: Not on file   Occupational History   • Not on file   Social Needs   • Financial resource strain: Not on file   • Food insecurity     Worry: Not on file     Inability: Not on file   • Transportation needs     Medical: Not on file     Non-medical: Not on file   Tobacco Use   • Smoking status: Never Smoker   • Smokeless tobacco: Never Used   Substance and Sexual Activity   • Alcohol use: Yes     Alcohol/week: 0.0 oz     Comment: 3 drinks week   • Drug use: No   • Sexual activity: Yes     Partners: Male     Birth control/protection: Pill   Lifestyle   • Physical activity     Days per week: Not on file     Minutes per session: Not on file   • Stress: Not on file   Relationships   • Social connections     Talks on phone: Not on file     Gets together: Not on file     Attends [employer] service: Not on file     Active member of club or organization: Not on file     Attends meetings of clubs or organizations: Not on file     Relationship status: Not on file   • Intimate partner violence     Fear of current or ex partner: Not on file     Emotionally abused: Not on file     Physically abused: Not on file     Forced sexual activity: Not on file   Other Topics Concern   • Not on file   Social History Narrative   • Not on file     FH:   Family History   Problem Relation Age of Onset   • Hypertension Father    • Asthma Sister    • Diabetes Paternal Grandfather          Objective:   /82   Pulse 72   Temp 37.4 °C (99.3 °F)   Resp 16   Ht 1.727 m (5' 8\")   Wt 61.2 kg (135 lb)   SpO2 97%   BMI 20.53 kg/m²     Physical Exam  Vitals signs and nursing note reviewed.   Constitutional:       General: She is not in acute distress.     Appearance: Normal appearance. She is normal weight. She is not ill-appearing or toxic-appearing.   HENT: "      Head: Normocephalic and atraumatic.      Right Ear: Tympanic membrane, ear canal and external ear normal.      Left Ear: Tympanic membrane, ear canal and external ear normal.      Nose: No congestion or rhinorrhea.      Mouth/Throat:      Mouth: Mucous membranes are moist.      Pharynx: Uvula midline. Posterior oropharyngeal erythema present. No pharyngeal swelling, oropharyngeal exudate or uvula swelling.      Tonsils: No tonsillar exudate or tonsillar abscesses. 0 on the right. 0 on the left.   Eyes:      General:         Right eye: No discharge.         Left eye: No discharge.      Extraocular Movements: Extraocular movements intact.      Pupils: Pupils are equal, round, and reactive to light.   Neck:      Musculoskeletal: Normal range of motion and neck supple. No neck rigidity or muscular tenderness.   Cardiovascular:      Rate and Rhythm: Normal rate and regular rhythm.      Heart sounds: Normal heart sounds.   Pulmonary:      Effort: Pulmonary effort is normal. No respiratory distress.      Breath sounds: Normal breath sounds. No wheezing, rhonchi or rales.   Chest:      Chest wall: No tenderness.   Abdominal:      General: Abdomen is flat. There is no distension.   Musculoskeletal:         General: No swelling or tenderness.   Lymphadenopathy:      Cervical: No cervical adenopathy.   Skin:     General: Skin is warm and dry.      Findings: No rash.   Neurological:      General: No focal deficit present.      Mental Status: She is alert and oriented to person, place, and time. Mental status is at baseline.   Psychiatric:         Mood and Affect: Mood normal.         Behavior: Behavior normal.         Thought Content: Thought content normal.         Judgment: Judgment normal.       poct flu:neg  Assessment/Plan:   Assessment    1. Pharyngitis, unspecified etiology  dexamethasone (DECADRON) injection (check route below) 10 mg   2. Suspected COVID-19 virus infection  COVID/SARS COV-2 PCR   3. Flu-like  symptoms  POCT Influenza A/B      She was tested for COIVD in the clinic and I will call her with results. She was educated to self isolate at home until resolution of symptoms and results of COVID are received. Follow up in clinic or ER for worsening of symptoms. She is in agreement with this POC. We discussed supportive measures including warm salt water gargles, over-the-counter Cepacol throat lozenges, rest  and increased fluids.  She was encouraged to seek treatment back in the ER or urgent care for worsening symptoms,  fever greater than 100.5, wheezes or shortness of breath.

## 2020-06-26 NOTE — PATIENT INSTRUCTIONS

## 2020-06-27 LAB
SARS-COV-2 RNA RESP QL NAA+PROBE: NOTDETECTED
SPECIMEN SOURCE: NORMAL

## 2020-10-27 ENCOUNTER — OFFICE VISIT (OUTPATIENT)
Dept: URGENT CARE | Facility: PHYSICIAN GROUP | Age: 30
End: 2020-10-27
Payer: COMMERCIAL

## 2020-10-27 ENCOUNTER — HOSPITAL ENCOUNTER (OUTPATIENT)
Facility: MEDICAL CENTER | Age: 30
End: 2020-10-27
Attending: PHYSICIAN ASSISTANT
Payer: COMMERCIAL

## 2020-10-27 VITALS
BODY MASS INDEX: 21.22 KG/M2 | SYSTOLIC BLOOD PRESSURE: 102 MMHG | DIASTOLIC BLOOD PRESSURE: 60 MMHG | RESPIRATION RATE: 14 BRPM | HEIGHT: 68 IN | TEMPERATURE: 98.2 F | WEIGHT: 140 LBS | OXYGEN SATURATION: 98 % | HEART RATE: 83 BPM

## 2020-10-27 DIAGNOSIS — R11.0 NAUSEA: ICD-10-CM

## 2020-10-27 DIAGNOSIS — R19.7 DIARRHEA, UNSPECIFIED TYPE: ICD-10-CM

## 2020-10-27 PROCEDURE — U0003 INFECTIOUS AGENT DETECTION BY NUCLEIC ACID (DNA OR RNA); SEVERE ACUTE RESPIRATORY SYNDROME CORONAVIRUS 2 (SARS-COV-2) (CORONAVIRUS DISEASE [COVID-19]), AMPLIFIED PROBE TECHNIQUE, MAKING USE OF HIGH THROUGHPUT TECHNOLOGIES AS DESCRIBED BY CMS-2020-01-R: HCPCS

## 2020-10-27 PROCEDURE — 99213 OFFICE O/P EST LOW 20 MIN: CPT | Performed by: PHYSICIAN ASSISTANT

## 2020-10-27 ASSESSMENT — ENCOUNTER SYMPTOMS
VOMITING: 0
COUGH: 0
DIARRHEA: 1
SPUTUM PRODUCTION: 0
CONSTIPATION: 0
SHORTNESS OF BREATH: 0
SORE THROAT: 0
FEVER: 0
WHEEZING: 0
BLOOD IN STOOL: 0
ABDOMINAL PAIN: 0
CHILLS: 0
NAUSEA: 1

## 2020-10-27 NOTE — LETTER
October 27, 2020       Patient: Susan Garcia   YOB: 1990   Date of Visit: 10/27/2020       To Whom it May Concern,   Your employee was seen in our clinic today. A concern for COVID-19 has been identified and testing is in progress.?   ?  We are asking you to excuse absences while following self-isolation protocol per Center for Disease Control (CDC) guidelines. Your employee will be able to access test results through our electronic delivery system called [email or web address].?   ?  If the results of testing are negative, and once there has been no fever (temperature >100.4 F) for at least 72 hours without treatment, and no vomiting or diarrhea for at least 48 hours, then return to work is approved.   ?  If the results of testing are positive then your employee will be contacted by the [facility] or [facility] department for further instructions on duration of self-isolation and return to work protocol. In general, this will also follow the CDC guidelines with a minimum of 10 days from the onset of symptoms and without fever, vomiting, or diarrhea as above.?   ?  In general, repeat testing is not necessary and not offered through our [facility] care.?   ?  This is the only note that will be provided from [facility] for this visit. Your employee will require an appointment with a primary care provider if FMLA or disability forms are required.   ?  Sincerely    Juventino Hernandez P.A.-C.  Electronically Signed

## 2020-10-27 NOTE — PROGRESS NOTES
Subjective:   Susan Garcia  is a 30 y.o. female who presents for Diarrhea (nausea, had surgery on thursday )      Diarrhea   Pertinent negatives include no abdominal pain, chills, coughing, fever or vomiting.     Patient comes to clinic for Covid testing.  She states she had surgery last Thursday, 5 days ago.  She states for the first few days after surgery she was constipated she used a laxative on Saturday.  She has had 1-2 episodes of diarrhea daily since.  She denies blood per stool or melena.  She denies abnormalities of urine.  She denies fevers chills or cough.  She has noted some darker color with diarrhea after when she is tried Pepto but denies blood per stool or melena.  She is used Zofran as well as Pepto for symptoms with mild improvement.  She states she talked to her surgeon who recommended Covid testing with persistence.  She denies vomiting but complains of nausea.  She does use Zofran with good treatment of nausea.  She notes past medical history of significant nausea following surgery but did resolve much sooner.  She denies abdominal pain.  She denies antibiotics in the week prior to surgery.  She denies recent travel or preparing water from outdoor sources.  She denies loss of taste or smell.  She denies cough sore throat or ear pain.  She feels slightly rundown.  She notes remote history of use of MDI with sports induced asthma but denies history of asthma or adulthood wheezing.  She denies history of bronchitis or pneumonia    Review of Systems   Constitutional: Negative for chills and fever.   HENT: Negative for congestion, ear pain and sore throat.    Respiratory: Negative for cough, sputum production, shortness of breath and wheezing.    Gastrointestinal: Positive for diarrhea and nausea. Negative for abdominal pain, blood in stool, constipation, melena and vomiting.   Genitourinary: Negative.    Skin: Negative for rash.       Allergies   Allergen Reactions   • Sulfa Drugs  "Unspecified     Last reaction was when she was a child. Has always stayed away from sulfa.        Objective:   /60   Pulse 83   Temp 36.8 °C (98.2 °F) (Temporal)   Resp 14   Ht 1.727 m (5' 8\")   Wt 63.5 kg (140 lb)   SpO2 98%   BMI 21.29 kg/m²     Physical Exam  Vitals signs and nursing note reviewed.   Constitutional:       General: She is not in acute distress.     Appearance: She is well-developed. She is not diaphoretic.   HENT:      Head: Normocephalic and atraumatic.      Right Ear: Tympanic membrane, ear canal and external ear normal.      Left Ear: Tympanic membrane, ear canal and external ear normal.      Nose: Nose normal.      Mouth/Throat:      Pharynx: Uvula midline. Posterior oropharyngeal erythema ( mild PND) present. No oropharyngeal exudate.      Tonsils: No tonsillar abscesses.   Eyes:      General: Lids are normal. No scleral icterus.        Right eye: No discharge.         Left eye: No discharge.      Conjunctiva/sclera: Conjunctivae normal.   Neck:      Musculoskeletal: Neck supple.   Pulmonary:      Effort: Pulmonary effort is normal. No respiratory distress.      Breath sounds: No decreased breath sounds, wheezing, rhonchi or rales.   Abdominal:      General: Abdomen is flat. Bowel sounds are normal. There is no distension.      Palpations: There is no mass.      Tenderness: There is no abdominal tenderness. There is no right CVA tenderness, left CVA tenderness, guarding or rebound.   Musculoskeletal: Normal range of motion.   Lymphadenopathy:      Cervical: Cervical adenopathy ( mild bilat) present.   Skin:     General: Skin is warm and dry.      Coloration: Skin is not pale.      Findings: No erythema.   Neurological:      Mental Status: She is alert and oriented to person, place, and time. She is not disoriented.   Psychiatric:         Speech: Speech normal.         Behavior: Behavior normal.       COVID pending    Assessment/Plan:   1. Diarrhea, unspecified type  - COVID/SARS " COV-2 PCR; Future    2. Nausea  Supportive care is reviewed with patient/caregiver - recommend to push PO fluids and electrolytes, over-the-counter symptom support medications reviewed, ER precautions with worsened symptoms, quarantine recommendations reviewed, sent with letter, [place] for results of testing  Return to clinic with lack of resolution or progression of symptoms.  ER precautions with any worsening symptoms are reviewed with patient/caregiver and they do express understanding    I have worn an N95 mask, gloves and eye protection for the entire encounter with this patient.     Differential diagnosis, natural history, supportive care, and indications for immediate follow-up discussed.

## 2020-10-28 DIAGNOSIS — R19.7 DIARRHEA, UNSPECIFIED TYPE: ICD-10-CM

## 2020-10-28 LAB
COVID ORDER STATUS COVID19: NORMAL
SARS-COV-2 RNA RESP QL NAA+PROBE: NOTDETECTED
SPECIMEN SOURCE: NORMAL

## 2021-04-05 ENCOUNTER — APPOINTMENT (RX ONLY)
Dept: URBAN - METROPOLITAN AREA CLINIC 31 | Facility: CLINIC | Age: 31
Setting detail: DERMATOLOGY
End: 2021-04-05

## 2021-04-05 DIAGNOSIS — D18.0 HEMANGIOMA: ICD-10-CM

## 2021-04-05 DIAGNOSIS — D22 MELANOCYTIC NEVI: ICD-10-CM

## 2021-04-05 DIAGNOSIS — Z71.89 OTHER SPECIFIED COUNSELING: ICD-10-CM

## 2021-04-05 DIAGNOSIS — L81.4 OTHER MELANIN HYPERPIGMENTATION: ICD-10-CM

## 2021-04-05 PROBLEM — D22.71 MELANOCYTIC NEVI OF RIGHT LOWER LIMB, INCLUDING HIP: Status: ACTIVE | Noted: 2021-04-05

## 2021-04-05 PROBLEM — D22.62 MELANOCYTIC NEVI OF LEFT UPPER LIMB, INCLUDING SHOULDER: Status: ACTIVE | Noted: 2021-04-05

## 2021-04-05 PROBLEM — D22.72 MELANOCYTIC NEVI OF LEFT LOWER LIMB, INCLUDING HIP: Status: ACTIVE | Noted: 2021-04-05

## 2021-04-05 PROBLEM — D22.61 MELANOCYTIC NEVI OF RIGHT UPPER LIMB, INCLUDING SHOULDER: Status: ACTIVE | Noted: 2021-04-05

## 2021-04-05 PROBLEM — D22.5 MELANOCYTIC NEVI OF TRUNK: Status: ACTIVE | Noted: 2021-04-05

## 2021-04-05 PROBLEM — D23.72 OTHER BENIGN NEOPLASM OF SKIN OF LEFT LOWER LIMB, INCLUDING HIP: Status: ACTIVE | Noted: 2021-04-05

## 2021-04-05 PROBLEM — D18.01 HEMANGIOMA OF SKIN AND SUBCUTANEOUS TISSUE: Status: ACTIVE | Noted: 2021-04-05

## 2021-04-05 PROCEDURE — 99385 PREV VISIT NEW AGE 18-39: CPT

## 2021-04-05 PROCEDURE — ? COUNSELING

## 2021-04-05 ASSESSMENT — LOCATION SIMPLE DESCRIPTION DERM
LOCATION SIMPLE: LEFT POSTERIOR THIGH
LOCATION SIMPLE: RIGHT POPLITEAL SKIN
LOCATION SIMPLE: LEFT KNEE
LOCATION SIMPLE: RIGHT UPPER ARM
LOCATION SIMPLE: RIGHT POSTERIOR THIGH
LOCATION SIMPLE: RIGHT KNEE
LOCATION SIMPLE: ABDOMEN
LOCATION SIMPLE: RIGHT ELBOW
LOCATION SIMPLE: LEFT POPLITEAL SKIN
LOCATION SIMPLE: LEFT UPPER ARM
LOCATION SIMPLE: LEFT FOREARM
LOCATION SIMPLE: UPPER BACK
LOCATION SIMPLE: RIGHT FOREARM
LOCATION SIMPLE: LEFT ELBOW

## 2021-04-05 ASSESSMENT — LOCATION DETAILED DESCRIPTION DERM
LOCATION DETAILED: RIGHT KNEE
LOCATION DETAILED: INFERIOR THORACIC SPINE
LOCATION DETAILED: RIGHT VENTRAL DISTAL FOREARM
LOCATION DETAILED: RIGHT POPLITEAL SKIN
LOCATION DETAILED: LEFT ELBOW
LOCATION DETAILED: RIGHT ANTECUBITAL SKIN
LOCATION DETAILED: LEFT KNEE
LOCATION DETAILED: PERIUMBILICAL SKIN
LOCATION DETAILED: EPIGASTRIC SKIN
LOCATION DETAILED: RIGHT PROXIMAL DORSAL FOREARM
LOCATION DETAILED: LEFT PROXIMAL DORSAL FOREARM
LOCATION DETAILED: LEFT VENTRAL PROXIMAL FOREARM
LOCATION DETAILED: RIGHT DISTAL POSTERIOR THIGH
LOCATION DETAILED: RIGHT ELBOW
LOCATION DETAILED: LEFT ANTECUBITAL SKIN
LOCATION DETAILED: LEFT LATERAL POPLITEAL SKIN
LOCATION DETAILED: LEFT DISTAL POSTERIOR THIGH

## 2021-04-05 ASSESSMENT — LOCATION ZONE DERM
LOCATION ZONE: LEG
LOCATION ZONE: TRUNK
LOCATION ZONE: ARM

## 2021-04-20 ENCOUNTER — TELEMEDICINE (OUTPATIENT)
Dept: TELEHEALTH | Facility: TELEMEDICINE | Age: 31
End: 2021-04-20
Payer: COMMERCIAL

## 2021-04-20 DIAGNOSIS — R30.0 DYSURIA: ICD-10-CM

## 2021-04-20 DIAGNOSIS — N30.00 ACUTE CYSTITIS WITHOUT HEMATURIA: ICD-10-CM

## 2021-04-20 PROCEDURE — 99214 OFFICE O/P EST MOD 30 MIN: CPT | Mod: 95,CR | Performed by: PHYSICIAN ASSISTANT

## 2021-04-20 RX ORDER — NITROFURANTOIN 25; 75 MG/1; MG/1
100 CAPSULE ORAL EVERY 12 HOURS
Qty: 10 CAPSULE | Refills: 0 | Status: SHIPPED | OUTPATIENT
Start: 2021-04-20 | End: 2021-04-25

## 2021-04-20 ASSESSMENT — ENCOUNTER SYMPTOMS
FEVER: 0
NAUSEA: 0
ABDOMINAL PAIN: 0
CARDIOVASCULAR NEGATIVE: 1
CHILLS: 0
RESPIRATORY NEGATIVE: 1
DIARRHEA: 0
VOMITING: 0
FLANK PAIN: 0

## 2021-04-20 NOTE — PROGRESS NOTES
Subjective:      Ekaterina Garcia is a 30 y.o. female who presents with Dysuria    This evaluation was conducted via Zoom using secure and encrypted videoconferencing technology. The patient was in a private location in the [facility].    The patient's identity was confirmed and verbal consent was obtained for this virtual visit.             Dysuria   This is a new problem. The current episode started in the past 7 days. The problem occurs every urination. The problem has been unchanged. The quality of the pain is described as burning. The patient is experiencing no pain. There has been no fever. The fever has been present for less than 1 day. There is no history of pyelonephritis. Associated symptoms include frequency and urgency. Pertinent negatives include no chills, flank pain, hematuria, hesitancy, nausea or vomiting. She has tried increased fluids for the symptoms. The treatment provided mild relief. There is no history of kidney stones or recurrent UTIs.       PMH:  has a past medical history of ASTHMA (04/2019) and Heart burn.  MEDS:   Current Outpatient Medications:   •  amoxicillin-clavulanate (AUGMENTIN) 875-125 MG Tab, Take 1 Tab by mouth 2 times a day., Disp: , Rfl:   •  fluticasone (FLONASE) 50 MCG/ACT nasal spray, Spray 1 Spray in nose every day. (Patient not taking: Reported on 6/26/2020), Disp: 16 g, Rfl: 0  •  ondansetron (ZOFRAN) 8 MG Tab, Take 1 Tab by mouth every 8 hours as needed for Nausea/Vomiting., Disp: 15 Tab, Rfl: 0  •  levonorgestrel-ethinyl estradiol (ANNY-28) 0.15-30 MG-MCG per tablet, Take 1 Tab by mouth every day., Disp: 84 Tab, Rfl: 1  ALLERGIES:   Allergies   Allergen Reactions   • Sulfa Drugs Unspecified     Last reaction was when she was a child. Has always stayed away from sulfa.     SURGHX:   Past Surgical History:   Procedure Laterality Date   • FINGER ORIF Left 4/4/2019    Procedure: ORIF, FINGER- middle, ring, small;  Surgeon: Nas Merino M.D.;  Location:  SURGERY [facility];  Service: Orthopedics   • TONSILLECTOMY  1994     SOCHX:  reports that she has never smoked. She has never used smokeless tobacco. She reports current alcohol use. She reports that she does not use drugs.  FH: family history includes Asthma in her sister; Diabetes in her paternal grandfather; Hypertension in her father.    Review of Systems   Constitutional: Negative for chills and fever.   Respiratory: Negative.    Cardiovascular: Negative.    Gastrointestinal: Negative for abdominal pain, diarrhea, nausea and vomiting.   Genitourinary: Positive for dysuria, frequency and urgency. Negative for flank pain, hematuria and hesitancy.       Medications, Allergies, and current problem list reviewed today in Epic     Objective:     There were no vitals taken for this visit.     Physical Exam  Vitals and nursing note reviewed.   Constitutional:       General: She is not in acute distress.     Appearance: Normal appearance. She is well-developed. She is not ill-appearing or toxic-appearing.   HENT:      Head: Normocephalic and atraumatic.      Right Ear: External ear normal.      Left Ear: External ear normal.      Nose: Nose normal.   Eyes:      Conjunctiva/sclera: Conjunctivae normal.   Pulmonary:      Effort: Pulmonary effort is normal.   Neurological:      Mental Status: She is alert and oriented to person, place, and time.   Psychiatric:         Mood and Affect: Mood normal.         Behavior: Behavior normal.         Thought Content: Thought content normal.         Judgment: Judgment normal.                 Assessment/Plan:         1. Dysuria  POCT Urinalysis    POCT PREGNANCY   2. Acute cystitis without hematuria  Urine Culture    nitrofurantoin (MACROBID) 100 MG Cap     Patient denies fevers, sharp abdominal pain, flank pain, vomiting.  No history of kidney infection.  Should be treated for a lower bladder infection.  Urine studies ordered in her chart.  Should symptoms worsen or change she  will be seen in person in clinic.  Take full course of antibiotic  Urine culture, I will only call patient if I need to change antibiotic pending results  Significantly increase fluids  OTC Motrin or Azo as needed  Cranberry as tolerated    Return to clinic or go to ED if symptoms worsen or persist. Indications for ED discussed at length. Patient/Parent/Guardian voices understanding. Follow-up with your primary care provider in 3-5 days. Red flag symptoms discussed. All side effects of medication discussed including allergic response, GI upset, tendon injury, rash, sedation etc.    Please note that this dictation was created using voice recognition software. I have made every reasonable attempt to correct obvious errors, but I expect that there are errors of grammar and possibly content that I did not discover before finalizing the note.

## 2021-08-04 NOTE — OP REPORT
DATE OF SERVICE:  04/04/2019    PREOPERATIVE DIAGNOSIS:  Proximal phalanx fractures, left small, ring and   middle fingers.    POSTOPERATIVE DIAGNOSIS:  Proximal phalanx fractures, left small, ring and   middle fingers.    PROCEDURES:  1.  ORIF, left middle finger proximal phalanx.  2.  ORIF, left ring finger proximal phalanx.  3.  ORIF, left small finger proximal phalanx.    SURGEON:  Nas Merino MD    ANESTHESIA:  General.    ESTIMATED BLOOD LOSS:  Minimal.    DRAINS:  None.    COMPLICATIONS:  None.    INDICATIONS:  Patient is a female, who had horse injury, sustained fractures   with rotation displacement of her proximal phalanx of all 3 fingers.  I   explained the risks, benefits, complications, and alternatives to surgery.    All questions were answered.  No guarantees were given.  Signed consent was   obtained.    DESCRIPTION OF PROCEDURE:  Patient was taken to operating room and laid supine   on the operating room table.  All bony prominences well padded.  Good general   was obtained without difficulty.  The left upper extremity was prepped and   draped in the usual sterile fashion using ChloraPrep.  Limb was exsanguinated   with elevation.  Tourniquet was raised.  Total tourniquet time was about 2   hours.  Initially started with the middle finger trying to get reduction,   there was too much comminution ____ gain length percutaneously.  I   subsequently made an open incision, split the extensor tendon down the   fracture curetted.  I was then able to anatomically reduce this.  I   provisionally pinned it with a couple 0.028 K-wires.  I subsequently then   replaced these K-wires with 3 Synthes 1.0 screws that were placed in a lag   fashion.  They were found to be in good position and length of the screws.    Clinical alignment of the finger appeared to be in a nice position.  The wound   was copiously irrigated.  We essentially repeated this process for all 3   fingers, the ring finger in similar  Subjective   Patient ID: Amparo is a 27 year old female.    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  She has no employment documents with her today. States UTD on vaccinations, no records available.  States TB testing is not required for employment.  No questions, concerns or complaints today.        Past Medical History:   Diagnosis Date   • Acne        MEDICATIONS:  Current Outpatient Medications   Medication Sig   • valACYclovir (VALTREX) 500 MG tablet Take 1 tablet by mouth 2 times daily.   • predniSONE (DELTASONE) 20 MG tablet 20mg 3 tabs po qd x 5dys, 2 tabs po qd x 5 dys, 1 tab po qd x 5dys - take in AM with food   • nystatin (MYCOSTATIN) 924531 UNIT/ML suspension 5 ml by mouth 4 times daily until clear   • ketoconazole (NIZORAL) 2 % cream Apply topically 2 times daily.   • hydrOXYzine (ATARAX) 25 MG tablet Take 1 tablet by mouth every 8 hours as needed for Itching.   • triamcinolone (ARISTOCORT) 0.1 % ointment Apply topically 2 times daily as needed (rash).   • fluticasone (FLONASE) 50 MCG/ACT nasal spray Two sprays to each nostril daily as directed (stop if nosebleeds)   • fluticasone (FLONASE) 50 MCG/ACT nasal spray Two sprays to each nostril daily as directed (stop if nosebleeds)   • desogestrel-ethinyl estradiol (APRI) 0.15-30 MG-MCG per tablet Take 1 tablet by mouth daily.     No current facility-administered medications for this visit.       ALLERGIES:  ALLERGIES:   Allergen Reactions   • Cefdinir RASH   • Cephalosporins HIVES       PAST SURGICAL HISTORY:  Past Surgical History:   Procedure Laterality Date   • Sawyer tooth extraction         FAMILY HISTORY:  Family History   Problem Relation Age of Onset   • Patient is unaware of any medical problems Mother    • Patient is unaware of any medical problems Father        SOCIAL HISTORY:  Social History     Tobacco Use   • Smoking status: Never  Smoker   • Smokeless tobacco: Never Used   Substance Use Topics   • Alcohol use: Yes     Comment: RARELY   • Drug use: No         Patient's medications, allergies, past medical, surgical, social and family histories were reviewed and updated as appropriate.  Patient's medications, allergies, past medical, surgical, and social history  were reviewed and updated as appropriate.    Review of Systems   Constitutional: Negative.    HENT: Negative.    Eyes: Negative.    Respiratory: Negative.    Cardiovascular: Negative.    Gastrointestinal: Negative.    Genitourinary: Negative.    Musculoskeletal: Negative.    Skin: Negative.    Neurological: Negative.    Psychiatric/Behavioral: Negative.        Objective   Physical Exam  Vitals reviewed.   Constitutional:       Appearance: Normal appearance. She is well-developed.   HENT:      Head: Normocephalic and atraumatic.      Right Ear: Hearing, tympanic membrane and ear canal normal.      Left Ear: Hearing, tympanic membrane and ear canal normal.      Nose: Nose normal.      Mouth/Throat:      Mouth: Mucous membranes are moist.      Pharynx: Oropharynx is clear. Uvula midline.   Eyes:      General: Lids are normal. Vision grossly intact.      Extraocular Movements: Extraocular movements intact.      Conjunctiva/sclera: Conjunctivae normal.      Pupils: Pupils are equal, round, and reactive to light.   Cardiovascular:      Rate and Rhythm: Normal rate and regular rhythm.      Pulses: Normal pulses.           Radial pulses are 2+ on the right side and 2+ on the left side.        Dorsalis pedis pulses are 2+ on the right side and 2+ on the left side.      Heart sounds: Normal heart sounds, S1 normal and S2 normal. No murmur heard.     Pulmonary:      Effort: Pulmonary effort is normal.      Breath sounds: Normal breath sounds.   Abdominal:      General: Bowel sounds are normal.      Palpations: Abdomen is soft.      Tenderness: There is no abdominal tenderness.   Genitourinary:     fashion.  I made an incision dorsally over   the proximal phalanx through skin and subcutaneous tissues, split the   extensor tendon, reduced.  The ring finger had more of a butterfly and an   extended fracture line.  In which case, I secured the proximal part of the   fracture line first with a couple of 1.0 K-wires from radial to ulnar, the   butterfly fragment, I then secured from ulnar to radial also with a 1.0 screw.    I then subsequently out distally at the joint surface placed two 1.0 screws   from more of the head fragment.  The overall alignment, clinical rotation and   position appeared to be in good position.  Hardware appropriate length   appeared to be stable through a range of motion.  I then made a third incision   over the small finger proximal phalanx through skin and split the extensor   tendon and ended up, again predrilling with 0.028 K-wires.  We placed 3 lag   screws across the oblique fracture of the small finger.  Overall, clinically   the patient seemed to have reasonable rotation alignment, hardware appropriate   length verified under fluoroscopy.  The wounds were all copiously irrigated.    The extensor tendon splits were closed with Vicryl, skin with nylon.  Digital   blocks were given.  Sterile dressing of Xeroform, 4x4, Sof-Rol, and ulnar   gutter splint intrinsic was applied.  All needle and sponge counts were   correct.  Patient tolerated the procedure well and was transferred to recovery   in stable condition.       ____________________________________     MD SUJEY LOPEZ / KAVYA    DD:  04/05/2019 07:15:25  DT:  04/05/2019 08:01:25    D#:  [id number]  Job#:  [id number]    Comments: Deferred to PCP  Musculoskeletal:      Right shoulder: Normal.      Left shoulder: Normal.      Right wrist: Normal.      Left wrist: Normal.      Cervical back: Full passive range of motion without pain and normal range of motion.      Thoracic back: Normal.      Lumbar back: Normal.      Right knee: Normal.      Left knee: Normal.      Right ankle: Normal.      Left ankle: Normal.   Lymphadenopathy:      Cervical: No cervical adenopathy.   Skin:     General: Skin is warm and dry.      Capillary Refill: Capillary refill takes less than 2 seconds.      Findings: No rash.   Neurological:      General: No focal deficit present.      Mental Status: She is alert and oriented to person, place, and time.      Cranial Nerves: Cranial nerves are intact.      Sensory: Sensation is intact.      Motor: Motor function is intact.      Coordination: Coordination is intact.      Gait: Gait is intact.      Deep Tendon Reflexes: Reflexes are normal and symmetric.   Psychiatric:         Attention and Perception: Attention normal.         Mood and Affect: Mood normal.         Speech: Speech normal.         Behavior: Behavior normal.       Visit Vitals  [facility] 06/28/2021       Assessment   Problem List Items Addressed This Visit     None      Visit Diagnoses     Physical exam, pre-employment    -  Primary          This is a 27 year old year-old female who presents for a pre-employment physical.    PRE-EMPLOYMENT PHYSICAL    - Patient is cleared for employment.  SEE SCANNED FORM for vitals, visual acuity, allergies, medications, social history, family history, PMH and PSH.    - General physical scanned into record.  Patient presented with no paperwork today.  If patient has employment documents that need to be filled out, she will return to clinic with them.  She is aware that filling out these documents at a later date will be an extra change per clinic protocol.  - Patient states she does not require TB skin testing to begin  employment, and is UTD on vaccinations.  Should TB skin testing or vaccines be required by employer, patient may return to clinic.  - Form completed and given to patient.  Advised patient to continue to follow up with PCP for annual wellness examinations, preventative exams and lab work.    Instructions provided as documented in the AVS.    Thank you for visiting [facility].

## 2021-08-30 ENCOUNTER — HOSPITAL ENCOUNTER (OUTPATIENT)
Facility: MEDICAL CENTER | Age: 31
End: 2021-08-30
Attending: OBSTETRICS & GYNECOLOGY
Payer: COMMERCIAL

## 2021-08-30 LAB
ABO GROUP BLD: NORMAL
BLD GP AB SCN SERPL QL: NORMAL
HBV SURFACE AG SERPL QL IA: NORMAL
RH BLD: NORMAL
RUBV IGG SERPL IA-ACNC: NORMAL
TREPONEMA PALLIDUM IGG+IGM AB [PRESENCE] IN SERUM OR PLASMA BY IMMUNOASSAY: NORMAL

## 2021-08-31 LAB
FORWARD REASON: SPWHY: NORMAL
FORWARDED TO LAB: SPWHR: NORMAL
SPECIMEN SENT (2ND): SPWT2: NORMAL
SPECIMEN SENT: SPWT1: NORMAL

## 2021-12-18 LAB — GLUCOSE 1H P 50 G GLC PO SERPL-MCNC: 61 MG/DL

## 2022-03-07 LAB — GP B STREP DNA SPEC QL NAA+PROBE: POSITIVE

## 2022-03-30 ENCOUNTER — HOSPITAL ENCOUNTER (INPATIENT)
Facility: MEDICAL CENTER | Age: 32
LOS: 3 days | End: 2022-04-02
Attending: OBSTETRICS & GYNECOLOGY | Admitting: OBSTETRICS & GYNECOLOGY
Payer: COMMERCIAL

## 2022-03-30 DIAGNOSIS — Z91.89 AT RISK FOR BREASTFEEDING DIFFICULTY: Primary | ICD-10-CM

## 2022-03-30 PROBLEM — Z34.90 TERM PREGNANCY: Status: ACTIVE | Noted: 2022-03-30

## 2022-03-30 LAB
ABO GROUP BLD: NORMAL
BASOPHILS # BLD AUTO: 0.3 % (ref 0–1.8)
BASOPHILS # BLD: 0.03 K/UL (ref 0–0.12)
BLD GP AB SCN SERPL QL: NORMAL
CRYSTALS AMN MICRO: NORMAL
EOSINOPHIL # BLD AUTO: 0.08 K/UL (ref 0–0.51)
EOSINOPHIL NFR BLD: 0.8 % (ref 0–6.9)
ERYTHROCYTE [DISTWIDTH] IN BLOOD BY AUTOMATED COUNT: 46 FL (ref 35.9–50)
HCT VFR BLD AUTO: 39.7 % (ref 37–47)
HGB BLD-MCNC: 13.9 G/DL (ref 12–16)
IMM GRANULOCYTES # BLD AUTO: 0.04 K/UL (ref 0–0.11)
IMM GRANULOCYTES NFR BLD AUTO: 0.4 % (ref 0–0.9)
LYMPHOCYTES # BLD AUTO: 2.25 K/UL (ref 1–4.8)
LYMPHOCYTES NFR BLD: 21.8 % (ref 22–41)
MCH RBC QN AUTO: 33.3 PG (ref 27–33)
MCHC RBC AUTO-ENTMCNC: 35 G/DL (ref 33.6–35)
MCV RBC AUTO: 95 FL (ref 81.4–97.8)
MONOCYTES # BLD AUTO: 0.95 K/UL (ref 0–0.85)
MONOCYTES NFR BLD AUTO: 9.2 % (ref 0–13.4)
NEUTROPHILS # BLD AUTO: 6.97 K/UL (ref 2–7.15)
NEUTROPHILS NFR BLD: 67.5 % (ref 44–72)
NRBC # BLD AUTO: 0 K/UL
NRBC BLD-RTO: 0 /100 WBC
PLATELET # BLD AUTO: 221 K/UL (ref 164–446)
PMV BLD AUTO: 9.2 FL (ref 9–12.9)
RBC # BLD AUTO: 4.18 M/UL (ref 4.2–5.4)
RH BLD: NORMAL
WBC # BLD AUTO: 10.3 K/UL (ref 4.8–10.8)

## 2022-03-30 PROCEDURE — 86900 BLOOD TYPING SEROLOGIC ABO: CPT

## 2022-03-30 PROCEDURE — 36415 COLL VENOUS BLD VENIPUNCTURE: CPT

## 2022-03-30 PROCEDURE — 87426 SARSCOV CORONAVIRUS AG IA: CPT

## 2022-03-30 PROCEDURE — 302449 STATCHG TRIAGE ONLY (STATISTIC)

## 2022-03-30 PROCEDURE — 770002 HCHG ROOM/CARE - OB PRIVATE (112)

## 2022-03-30 PROCEDURE — 86850 RBC ANTIBODY SCREEN: CPT

## 2022-03-30 PROCEDURE — 89060 EXAM SYNOVIAL FLUID CRYSTALS: CPT

## 2022-03-30 PROCEDURE — 86901 BLOOD TYPING SEROLOGIC RH(D): CPT

## 2022-03-30 PROCEDURE — 85025 COMPLETE CBC W/AUTO DIFF WBC: CPT

## 2022-03-30 PROCEDURE — 700105 HCHG RX REV CODE 258: Performed by: OBSTETRICS & GYNECOLOGY

## 2022-03-30 PROCEDURE — 700111 HCHG RX REV CODE 636 W/ 250 OVERRIDE (IP): Performed by: OBSTETRICS & GYNECOLOGY

## 2022-03-30 RX ORDER — TERBUTALINE SULFATE 1 MG/ML
0.25 INJECTION, SOLUTION SUBCUTANEOUS
Status: DISCONTINUED | OUTPATIENT
Start: 2022-03-30 | End: 2022-03-31 | Stop reason: HOSPADM

## 2022-03-30 RX ORDER — SODIUM CHLORIDE, SODIUM LACTATE, POTASSIUM CHLORIDE, CALCIUM CHLORIDE 600; 310; 30; 20 MG/100ML; MG/100ML; MG/100ML; MG/100ML
INJECTION, SOLUTION INTRAVENOUS CONTINUOUS
Status: DISCONTINUED | OUTPATIENT
Start: 2022-03-30 | End: 2022-04-02 | Stop reason: HOSPADM

## 2022-03-30 RX ORDER — ACETAMINOPHEN 500 MG
1000 TABLET ORAL
Status: DISCONTINUED | OUTPATIENT
Start: 2022-03-30 | End: 2022-03-31 | Stop reason: HOSPADM

## 2022-03-30 RX ORDER — IBUPROFEN 800 MG/1
800 TABLET ORAL
Status: DISCONTINUED | OUTPATIENT
Start: 2022-03-30 | End: 2022-03-31 | Stop reason: HOSPADM

## 2022-03-30 RX ORDER — OXYTOCIN 10 [USP'U]/ML
10 INJECTION, SOLUTION INTRAMUSCULAR; INTRAVENOUS
Status: DISCONTINUED | OUTPATIENT
Start: 2022-03-30 | End: 2022-03-31 | Stop reason: HOSPADM

## 2022-03-30 RX ADMIN — SODIUM CHLORIDE 5 MILLION UNITS: 900 INJECTION INTRAVENOUS at 22:19

## 2022-03-30 ASSESSMENT — PAIN DESCRIPTION - PAIN TYPE: TYPE: ACUTE PAIN

## 2022-03-30 ASSESSMENT — PATIENT HEALTH QUESTIONNAIRE - PHQ9
SUM OF ALL RESPONSES TO PHQ9 QUESTIONS 1 AND 2: 0
2. FEELING DOWN, DEPRESSED, IRRITABLE, OR HOPELESS: NOT AT ALL
1. LITTLE INTEREST OR PLEASURE IN DOING THINGS: NOT AT ALL

## 2022-03-30 ASSESSMENT — LIFESTYLE VARIABLES: EVER_SMOKED: NEVER

## 2022-03-31 ENCOUNTER — ANESTHESIA EVENT (OUTPATIENT)
Dept: ANESTHESIOLOGY | Facility: MEDICAL CENTER | Age: 32
End: 2022-03-31
Payer: COMMERCIAL

## 2022-03-31 ENCOUNTER — ANESTHESIA (OUTPATIENT)
Dept: ANESTHESIOLOGY | Facility: MEDICAL CENTER | Age: 32
End: 2022-03-31
Payer: COMMERCIAL

## 2022-03-31 LAB
ABO + RH BLD: NORMAL
HIV 1+2 AB+HIV1 P24 AG SERPL QL IA: NORMAL
NUMBER OF RH DOSES IND 8505RD: NORMAL
SARS-COV+SARS-COV-2 AG RESP QL IA.RAPID: NOTDETECTED
SPECIMEN SOURCE: NORMAL

## 2022-03-31 PROCEDURE — 59409 OBSTETRICAL CARE: CPT

## 2022-03-31 PROCEDURE — 700111 HCHG RX REV CODE 636 W/ 250 OVERRIDE (IP): Performed by: INTERNAL MEDICINE

## 2022-03-31 PROCEDURE — 10907ZC DRAINAGE OF AMNIOTIC FLUID, THERAPEUTIC FROM PRODUCTS OF CONCEPTION, VIA NATURAL OR ARTIFICIAL OPENING: ICD-10-PCS | Performed by: OBSTETRICS & GYNECOLOGY

## 2022-03-31 PROCEDURE — 700101 HCHG RX REV CODE 250: Performed by: INTERNAL MEDICINE

## 2022-03-31 PROCEDURE — 303615 HCHG EPIDURAL/SPINAL ANESTHESIA FOR LABOR

## 2022-03-31 PROCEDURE — 700105 HCHG RX REV CODE 258: Performed by: INTERNAL MEDICINE

## 2022-03-31 PROCEDURE — 36415 COLL VENOUS BLD VENIPUNCTURE: CPT

## 2022-03-31 PROCEDURE — 700111 HCHG RX REV CODE 636 W/ 250 OVERRIDE (IP)

## 2022-03-31 PROCEDURE — 87389 HIV-1 AG W/HIV-1&-2 AB AG IA: CPT

## 2022-03-31 PROCEDURE — 304965 HCHG RECOVERY SERVICES

## 2022-03-31 PROCEDURE — 700111 HCHG RX REV CODE 636 W/ 250 OVERRIDE (IP): Performed by: OBSTETRICS & GYNECOLOGY

## 2022-03-31 PROCEDURE — A9270 NON-COVERED ITEM OR SERVICE: HCPCS | Performed by: OBSTETRICS & GYNECOLOGY

## 2022-03-31 PROCEDURE — 0KQM0ZZ REPAIR PERINEUM MUSCLE, OPEN APPROACH: ICD-10-PCS | Performed by: OBSTETRICS & GYNECOLOGY

## 2022-03-31 PROCEDURE — 700102 HCHG RX REV CODE 250 W/ 637 OVERRIDE(OP): Performed by: OBSTETRICS & GYNECOLOGY

## 2022-03-31 PROCEDURE — 770002 HCHG ROOM/CARE - OB PRIVATE (112)

## 2022-03-31 PROCEDURE — 700105 HCHG RX REV CODE 258: Performed by: OBSTETRICS & GYNECOLOGY

## 2022-03-31 RX ORDER — ROPIVACAINE HYDROCHLORIDE 2 MG/ML
INJECTION, SOLUTION EPIDURAL; INFILTRATION; PERINEURAL
Status: COMPLETED
Start: 2022-03-31 | End: 2022-03-31

## 2022-03-31 RX ORDER — CALCIUM CARBONATE 500 MG/1
1000 TABLET, CHEWABLE ORAL EVERY 6 HOURS PRN
Status: DISCONTINUED | OUTPATIENT
Start: 2022-03-31 | End: 2022-04-02 | Stop reason: HOSPADM

## 2022-03-31 RX ORDER — DOCUSATE SODIUM 100 MG/1
100 CAPSULE, LIQUID FILLED ORAL 2 TIMES DAILY PRN
Status: DISCONTINUED | OUTPATIENT
Start: 2022-03-31 | End: 2022-04-02 | Stop reason: HOSPADM

## 2022-03-31 RX ORDER — IBUPROFEN 800 MG/1
800 TABLET ORAL EVERY 8 HOURS PRN
Status: DISCONTINUED | OUTPATIENT
Start: 2022-03-31 | End: 2022-04-02 | Stop reason: HOSPADM

## 2022-03-31 RX ORDER — ACETAMINOPHEN 500 MG
1000 TABLET ORAL EVERY 6 HOURS PRN
Status: DISCONTINUED | OUTPATIENT
Start: 2022-03-31 | End: 2022-04-02 | Stop reason: HOSPADM

## 2022-03-31 RX ORDER — SODIUM CHLORIDE, SODIUM LACTATE, POTASSIUM CHLORIDE, CALCIUM CHLORIDE 600; 310; 30; 20 MG/100ML; MG/100ML; MG/100ML; MG/100ML
INJECTION, SOLUTION INTRAVENOUS PRN
Status: DISCONTINUED | OUTPATIENT
Start: 2022-03-31 | End: 2022-04-02 | Stop reason: HOSPADM

## 2022-03-31 RX ORDER — OXYCODONE HYDROCHLORIDE 5 MG/1
5 TABLET ORAL EVERY 4 HOURS PRN
Status: DISCONTINUED | OUTPATIENT
Start: 2022-03-31 | End: 2022-04-02 | Stop reason: HOSPADM

## 2022-03-31 RX ORDER — BUPIVACAINE HYDROCHLORIDE 2.5 MG/ML
INJECTION, SOLUTION EPIDURAL; INFILTRATION; INTRACAUDAL
Status: COMPLETED | OUTPATIENT
Start: 2022-03-31 | End: 2022-03-31

## 2022-03-31 RX ORDER — ROPIVACAINE HYDROCHLORIDE 2 MG/ML
INJECTION, SOLUTION EPIDURAL; INFILTRATION; PERINEURAL CONTINUOUS
Status: DISCONTINUED | OUTPATIENT
Start: 2022-03-31 | End: 2022-04-02 | Stop reason: HOSPADM

## 2022-03-31 RX ORDER — LIDOCAINE HYDROCHLORIDE AND EPINEPHRINE 15; 5 MG/ML; UG/ML
INJECTION, SOLUTION EPIDURAL
Status: COMPLETED | OUTPATIENT
Start: 2022-03-31 | End: 2022-03-31

## 2022-03-31 RX ORDER — SODIUM CHLORIDE, SODIUM LACTATE, POTASSIUM CHLORIDE, AND CALCIUM CHLORIDE .6; .31; .03; .02 G/100ML; G/100ML; G/100ML; G/100ML
1000 INJECTION, SOLUTION INTRAVENOUS
Status: DISCONTINUED | OUTPATIENT
Start: 2022-03-31 | End: 2022-03-31 | Stop reason: HOSPADM

## 2022-03-31 RX ORDER — ONDANSETRON 2 MG/ML
4 INJECTION INTRAMUSCULAR; INTRAVENOUS EVERY 4 HOURS PRN
Status: DISCONTINUED | OUTPATIENT
Start: 2022-03-31 | End: 2022-04-02 | Stop reason: HOSPADM

## 2022-03-31 RX ORDER — SODIUM CHLORIDE, SODIUM LACTATE, POTASSIUM CHLORIDE, AND CALCIUM CHLORIDE .6; .31; .03; .02 G/100ML; G/100ML; G/100ML; G/100ML
250 INJECTION, SOLUTION INTRAVENOUS PRN
Status: DISCONTINUED | OUTPATIENT
Start: 2022-03-31 | End: 2022-03-31 | Stop reason: HOSPADM

## 2022-03-31 RX ADMIN — FENTANYL CITRATE 100 MCG: 50 INJECTION, SOLUTION INTRAMUSCULAR; INTRAVENOUS at 05:37

## 2022-03-31 RX ADMIN — ROPIVACAINE HYDROCHLORIDE: 5 INJECTION, SOLUTION EPIDURAL; INFILTRATION; PERINEURAL at 09:53

## 2022-03-31 RX ADMIN — OXYTOCIN 2 MILLI-UNITS/MIN: 10 INJECTION, SOLUTION INTRAMUSCULAR; INTRAVENOUS at 04:20

## 2022-03-31 RX ADMIN — SODIUM CHLORIDE 2.5 MILLION UNITS: 9 INJECTION, SOLUTION INTRAVENOUS at 06:48

## 2022-03-31 RX ADMIN — ROPIVACAINE HYDROCHLORIDE: 5 INJECTION, SOLUTION EPIDURAL; INFILTRATION; PERINEURAL at 13:37

## 2022-03-31 RX ADMIN — ONDANSETRON 4 MG: 2 INJECTION INTRAMUSCULAR; INTRAVENOUS at 13:52

## 2022-03-31 RX ADMIN — ROPIVACAINE HYDROCHLORIDE: 5 INJECTION, SOLUTION EPIDURAL; INFILTRATION; PERINEURAL at 05:45

## 2022-03-31 RX ADMIN — SODIUM CHLORIDE, POTASSIUM CHLORIDE, SODIUM LACTATE AND CALCIUM CHLORIDE: 600; 310; 30; 20 INJECTION, SOLUTION INTRAVENOUS at 00:00

## 2022-03-31 RX ADMIN — IBUPROFEN 800 MG: 800 TABLET, FILM COATED ORAL at 17:16

## 2022-03-31 RX ADMIN — DOCUSATE SODIUM 100 MG: 100 CAPSULE, LIQUID FILLED ORAL at 19:49

## 2022-03-31 RX ADMIN — LIDOCAINE HYDROCHLORIDE,EPINEPHRINE BITARTRATE 5 ML: 15; .005 INJECTION, SOLUTION EPIDURAL; INFILTRATION; INTRACAUDAL; PERINEURAL at 05:37

## 2022-03-31 RX ADMIN — SODIUM CHLORIDE 2.5 MILLION UNITS: 9 INJECTION, SOLUTION INTRAVENOUS at 01:58

## 2022-03-31 RX ADMIN — ACETAMINOPHEN 1000 MG: 500 TABLET ORAL at 19:50

## 2022-03-31 RX ADMIN — CALCIUM CARBONATE 1000 MG: 500 TABLET, CHEWABLE ORAL at 08:52

## 2022-03-31 RX ADMIN — BUPIVACAINE HYDROCHLORIDE 4 ML: 2.5 INJECTION, SOLUTION EPIDURAL; INFILTRATION; INTRACAUDAL; PERINEURAL at 05:37

## 2022-03-31 RX ADMIN — SODIUM CHLORIDE 2.5 MILLION UNITS: 9 INJECTION, SOLUTION INTRAVENOUS at 11:13

## 2022-03-31 RX ADMIN — FAMOTIDINE 20 MG: 10 INJECTION INTRAVENOUS at 10:25

## 2022-03-31 RX ADMIN — ONDANSETRON 4 MG: 2 INJECTION INTRAMUSCULAR; INTRAVENOUS at 08:45

## 2022-03-31 ASSESSMENT — PAIN DESCRIPTION - PAIN TYPE
TYPE: ACUTE PAIN
TYPE: ACUTE PAIN

## 2022-03-31 ASSESSMENT — PAIN SCALES - GENERAL: PAIN_LEVEL: 4

## 2022-03-31 NOTE — ANESTHESIA POSTPROCEDURE EVALUATION
Patient: Susan Garcia    Procedure Summary     Date: 03/31/22 Room / Location:     Anesthesia Start: 0531 Anesthesia Stop: 1429    Procedure: Labor Epidural Diagnosis:     Scheduled Providers:  Responsible Provider: Yuniel Kelley M.D.    Anesthesia Type: epidural ASA Status: 2          Final Anesthesia Type: epidural  Last vitals  BP   Blood Pressure: 135/63    Temp   37.3 °C (99.1 °F)    Pulse   86   Resp   16    SpO2   98 %      Anesthesia Post Evaluation    Patient location during evaluation: floor  Patient participation: complete - patient participated  Level of consciousness: awake and alert  Pain score: 4    Airway patency: patent  Anesthetic complications: no  Cardiovascular status: hemodynamically stable  Respiratory status: acceptable  Hydration status: euvolemic    PONV: none          No complications documented.     Nurse Pain Score: 4 (NPRS)

## 2022-03-31 NOTE — PROGRESS NOTES
1920 Pt presents to OB triage at 39.5wks with reports of a possible ROM occurring at approximately 1530 today.  Noticed after voiding, continual leakage of clear fluid X2.  Endorses mild contractions, +FM, -VB and no further complication with pregnancy.  Pt reports to be GBS+.  Pt placed EFM for fetal assessment.  SSE done and fern collected, of note, +pool noted in vaginal vault.    2026 Dr. Larsen called, SBAR report given, Fern +    2108 Dr. Larsen at bedside to greet patient.  Awaiting labor bed.    2115 Report given to Janessa Yoon RN.

## 2022-03-31 NOTE — ANESTHESIA PREPROCEDURE EVALUATION
Date: 03/31/22  Procedure: Labor Epidural     Patient requests Neuraxial Labor Analgesia.     Anesthesia: No problems with Anesthesia.  Resp: No asthma or COPD history.  Cards: No pre-eclampsia, or known cardiac abnormalities.  Heme: No known bleeding disorders, platelets reviewed.     Risks of procedure discussed including: infection, bleeding, nerve damage, and post-dural puncture headache.       Relevant Problems   Other   (positive) Term pregnancy       Physical Exam    Airway   Mallampati: II  TM distance: >3 FB  Neck ROM: full       Cardiovascular - normal exam  Rhythm: regular  Rate: normal  (-) murmur     Dental - normal exam           Pulmonary - normal exam  Breath sounds clear to auscultation     Abdominal    Neurological - normal exam                 Anesthesia Plan    ASA 2       Plan - epidural   Neuraxial block will be labor analgesia                  Pertinent diagnostic labs and testing reviewed    Informed Consent:    Anesthetic plan and risks discussed with patient.    Use of blood products discussed with: patient whom.

## 2022-03-31 NOTE — PROGRESS NOTES
"2120: Report received from Sosa MEZA, POC discused, Dr Larsen in dept, discussed POC to include starting IV pitocin if pt is unchanged at next SVE.     2124: PT ambulatory and transferred to room S220, EFM and TOCO applied x1, VS taken, pt  Comfortable in bed with FOB \"Royce\" at bedside.     0030: SVE as charted    0130: Dr Larsen updated in dept on SVE, pt's prenatal labs reviewed, HIV lab result not found, order placed to draw, see orders for details    0400: SVE as charted    0420: Pt requesting epidural at this time, pt educated on epidural process, consents signed    0521: This RN telephoned Dr Adam, requested at bedside for epidural placement    0535: Timeout completed, all agreed     0555: Dr Larsen at bedside to discuss POC    0700: Bedside report given to Faith MEZA    "

## 2022-03-31 NOTE — PROGRESS NOTES
S) pt comfortable with epidural  O) vss  VE: 5/c  Fht's140's cat 1 reactive  [place] q4min  A/P Term iup   - arom - clear, continue pit

## 2022-03-31 NOTE — CARE PLAN
The patient is Stable - Low risk of patient condition declining or worsening         Progress made toward(s) clinical / shift goals:      Problem: Risk for Infection and Impaired Wound Healing  Goal: Patient will remain free from infection  Outcome: Progressing  Note: Pt will remain afebrile, and will show no s/s of infection.      Problem: Pain  Goal: Patient's pain will be alleviated or reduced to the patient’s comfort goal  Outcome: Progressing  Note: Pt continuously monitored for pain, educated on pain management options. Call light within reach, pt understands to call for RN regarding any needs or concerns.

## 2022-03-31 NOTE — H&P
DATE OF ADMISSION:  2022     HISTORY OF PRESENT ILLNESS:  The patient is a 31-year-old female  1,   para 0 at 39 and 4/7th weeks' gestation.  On arrival, EDC of 2022,   presents with spontaneous rupture of membranes earlier this evening.  She was   checked, she had gross pooling.  A fern slide was sent and was positive.  She   was checked and found to be a fingertip, thick and high.  She is maricel   about every 4 minutes on her own.  She has had uneventful prenatal course thus   far.  She had a first trimester screening, which was normal.  MSAFP was   negative.  She had a normal fetal survey.  Her 1-hour Glucola was 61 and her   group B strep is positive.     PAST MEDICAL HISTORY:  Significant for asthma.     ALLERGIES:  SULFA CAUSES HIVES.     OBSTETRICAL HISTORY:  .     SOCIAL HISTORY:  .  Denies any alcohol, tobacco or history of drug use.     FAMILY HISTORY:  Includes high cholesterol, diabetes, arthritis.     PAST SURGICAL HISTORY:  Includes tonsillectomy and 2 separate hand/finger   surgeries.     GYNECOLOGIC HISTORY:  Last menstrual period was 2021.  No history of   abnormal Pap smears or STDs including no history of herpes simplex virus.     CURRENT MEDICATIONS:  Include only prenatal vitamins.     PHYSICAL EXAMINATION:  VITAL SIGNS:  Blood pressures running 128-130/85-86/. She is afebrile.  Weight   is 165 pounds.  GENERAL:  Awake, alert, oriented, no acute distress.  CARDIOVASCULAR:  Regular rate and rhythm.  CHEST:  Clear to auscultation bilaterally.  ABDOMEN:  Gravid, nontender, nondistended, normal bowel sounds.  EXTREMITIES:  No cyanosis, clubbing or edema.  PELVIC:  Sterile vaginal exam, she had gross pooling and a fern slide was done   and did return positive.  Her cervix was checked by the nurse in triage who   reported she was fingertip, thick and high; however, she was earlier reported   from the office per patient report that she was 190% effaced.  Fetal  heart   rate tracing category 1, reactive, moderate variability, no decelerations.    She is maricel about every 4 minutes.     LABORATORY DATA:  She is O negative, antibody screen negative, hepatitis B   surface antigen negative, RPR nonreactive, rubella is immune.  Hepatitis C   antibody negative.  Urine culture was negative.  RPR nonreactive.  One-hour   glucose tolerance test was 61.  MSAFP was negative and group B strep again was   positive.  Current hemoglobin 13.9, hematocrit 39.7, platelet count 221,000.  HIV not found so will order.     ASSESSMENT AND PLAN:  A 31-year-old female,  at 39 and 4/7th weeks'   gestation, admitted for spontaneous rupture of membranes at term.  1.  Premature rupture of membranes at term.  Group B strep is positive.  We   will plan to start antibiotics for group B strep prophylaxis.  She is   maricel well on her own.  We will plan to recheck her in a couple of hours   and if there is no cervical change, then we will plan for augmentation with   Pitocin.  She may have epidural anesthesia if and when she desires.  Fetal   tracings overall reassuring.  Anticipate spontaneous vaginal delivery.  3.  Rh negative.  4. HIV ordered.         ______________________________  MD SOFIA BALBUENA/NATI    DD:  2022 00:05  DT:  2022 00:43    Job#:  [id number]

## 2022-03-31 NOTE — ANESTHESIA TIME REPORT
Anesthesia Start and Stop Event Times     Date Time Event    3/31/2022 0531 Ready for Procedure     0531 Anesthesia Start     1429 Anesthesia Stop        Responsible Staff  03/31/22    Name Role Begin End    Andrew Adam M.D. Anesth 0531 0700    Yuniel Kelley M.D. Anesth 0700 1429        Preop Diagnosis (Free Text):  Pre-op Diagnosis     Vo pregnancy at 39+6 weeks gestation, labor pain        Preop Diagnosis (Codes):    Premium Reason  A. 3PM - 7AM    Comments:

## 2022-03-31 NOTE — ANESTHESIA PROCEDURE NOTES
Epidural Block    Date/Time: 3/31/2022 5:37 AM  Performed by: Andrew Adam M.D.  Authorized by: Andrew Adam M.D.     Patient Location:  OB  Start Time:  3/31/2022 5:37 AM  End Time:  3/31/2022 5:38 AM  Reason for Block: labor analgesia    patient identified, IV checked, site marked, risks and benefits discussed, surgical consent, monitors and equipment checked, pre-op evaluation and timeout performed    Patient Position:  Sitting  Prep: ChloraPrep, patient draped and sterile technique    Monitoring:  Blood pressure, continuous pulse oximetry and heart rate  Approach:  Midline  Location:  L3-L4  Injection Technique:  YISSEL saline  Skin infiltration:  Lidocaine  Strength:  1%  Dose:  3ml  Needle Type:  Tuohy  Needle Gauge:  17 G  Needle Length:  3.5 in  Loss of resistance::  5  Catheter Size:  19 G  Catheter at Skin Depth:  10  Test Dose Result:  Negative

## 2022-04-01 LAB
ERYTHROCYTE [DISTWIDTH] IN BLOOD BY AUTOMATED COUNT: 47 FL (ref 35.9–50)
HCT VFR BLD AUTO: 31 % (ref 37–47)
HGB BLD-MCNC: 10.8 G/DL (ref 12–16)
MCH RBC QN AUTO: 33.6 PG (ref 27–33)
MCHC RBC AUTO-ENTMCNC: 34.8 G/DL (ref 33.6–35)
MCV RBC AUTO: 96.6 FL (ref 81.4–97.8)
PLATELET # BLD AUTO: 200 K/UL (ref 164–446)
PMV BLD AUTO: 9.3 FL (ref 9–12.9)
RBC # BLD AUTO: 3.21 M/UL (ref 4.2–5.4)
WBC # BLD AUTO: 11.4 K/UL (ref 4.8–10.8)

## 2022-04-01 PROCEDURE — 770002 HCHG ROOM/CARE - OB PRIVATE (112)

## 2022-04-01 PROCEDURE — 85027 COMPLETE CBC AUTOMATED: CPT

## 2022-04-01 PROCEDURE — 700102 HCHG RX REV CODE 250 W/ 637 OVERRIDE(OP): Performed by: OBSTETRICS & GYNECOLOGY

## 2022-04-01 PROCEDURE — A9270 NON-COVERED ITEM OR SERVICE: HCPCS | Performed by: OBSTETRICS & GYNECOLOGY

## 2022-04-01 PROCEDURE — 36415 COLL VENOUS BLD VENIPUNCTURE: CPT

## 2022-04-01 RX ADMIN — ACETAMINOPHEN 1000 MG: 500 TABLET ORAL at 06:35

## 2022-04-01 RX ADMIN — ACETAMINOPHEN 1000 MG: 500 TABLET ORAL at 16:04

## 2022-04-01 RX ADMIN — IBUPROFEN 800 MG: 800 TABLET, FILM COATED ORAL at 01:25

## 2022-04-01 RX ADMIN — IBUPROFEN 800 MG: 800 TABLET, FILM COATED ORAL at 11:39

## 2022-04-01 ASSESSMENT — PAIN DESCRIPTION - PAIN TYPE
TYPE: ACUTE PAIN

## 2022-04-01 NOTE — PROGRESS NOTES
PPD#1  S) pt without c/o  O) vss  Fundus; firm  Ext; no edema  Hgb 10.1  A/P PPD#1, s/p    - stable, continue orders, probable d/c home tomorrow

## 2022-04-01 NOTE — PROGRESS NOTES
TRANSFERRED FROM L&D TO POSTPARTUM VIA WHEELCHAIR. AMBULATES WITH STEADY GAIT. FUNDUS firm2/ @u.  PABLO light rubra. IVpatent rijght arm without redness or swelling. ORIENTED TO UNIT PROCEDURES AND INFANT SAFETY. PAIN . ENC TO CALL WITH NEEDS. C/o perineal pain medicated with ibuprofen

## 2022-04-01 NOTE — L&D DELIVERY NOTE
DATE OF SERVICE:  03/31/2022     The patient underwent normal spontaneous vaginal delivery of a viable female   infant over a very small midline episiotomy.  Spontaneous vaginal delivery of   the head in controlled sterile fashion.  Nuchal cord x2 was noted.  One was   clamped and cut at the perineum; however, I still could not untangle the   double nuchal and so we ended up delivering the infant through.  Infant was   placed on mother's chest, but then transferred over to the baby bed for   assistance.  Spontaneous vaginal delivery of intact placenta with 3-vessel   cord.  Fundus firm with Pitocin and massage.  Total estimated blood loss   approximately 100 mL. Midline episiotomy was a small second-degree repaired   using 3-0 Vicryl.  Apgars of the infant were 8 and 9.  Mother and infant were   both stable at the end of delivery.        ______________________________  Corinne E. Capurro, MD [facility]/SONNY/ABBY    DD:  03/31/2022 14:46  DT:  03/31/2022 15:22    Job#:  [id number]

## 2022-04-01 NOTE — LACTATION NOTE
This note was copied from a baby's chart.  31yr, , 39wk6d,     Baby in NBN since birth for O2 requirements.  RN called for first breastfeeding attempt at 0900 this morning.  Met with parents in NBN.  Baby asleep.  Baby now on room air and on monitors.  MOB reports that she has been using breast pump and hand expression and brought 4 ml of her expressed colostrum.    Baby asleep.  Unwrapped and placed skin to skin with mom.  Starting to wake and expressed colostrum onto her lips and mouth.  After several minutes baby started to root and open mouth.  Discussed and educated parents on feeding cues and what to expect with sleepiness and then cluster feeding over these next few early days of life.    Baby in cross cradle hold to left breast and helped mom with positioning.  Pillows placed for support and comfort.  Inverted nipple on left breast. Baby opened mouth and taught mom how to place nipple up towards baby's upper lip and then in baby's mouth over tongue and towards back of mouth. Discussed how to recognize and importance of deep latch.  Baby latched and sucked for a few seconds well and then stopped and let go and back to sleep.  Tactile stimulation and able to get baby to latch and suck a few more times before unable to wake.    Finger fed baby 4ml of expressed breast milk that mom brought from room.  Parents reassured.    Plan for today is to continue allowing baby to breastfeed when she is showing cues if appropriate and at least every 3 hours.  Mom to follow all breastfeeding attempts with use of breast pump and hand expression.  Continue to assist until baby able to room in and begin breastfeeding more independently.

## 2022-04-01 NOTE — PROGRESS NOTES
Bedside report received from DERRICK Velázquez. Care assumed. Shift chart check complete. Orders reviewed.

## 2022-04-01 NOTE — CARE PLAN
Problem: Knowledge Deficit - Postpartum  Goal: Patient will verbalize and demonstrate understanding of self and infant care  Outcome: Progressing     Problem: Psychosocial - Postpartum  Goal: Patient will verbalize and demonstrate effective bonding and parenting behavior  Outcome: Progressing     Problem: Altered Physiologic Condition  Goal: Patient physiologically stable as evidenced by normal lochia, palpable uterine involution and vitals within normal limits  Outcome: Progressing     Problem: Infection - Postpartum  Goal: Postpartum patient will be free of signs and symptoms of infection  Outcome: Progressing   The patient is Stable - Low risk of patient condition declining or worsening    Shift Goals  Clinical Goals: Adequate pain control; breastfeeding  Patient Goals: Adequate pain control; breastfeeding  Family Goals: Support patient and infant    Progress made toward(s) clinical / shift goals:  Met all shift goals; progressing on all care plan goals.    Patient is not progressing towards the following goals:

## 2022-04-01 NOTE — CARE PLAN
The patient is Stable - Low risk of patient condition declining or worsening    Shift Goals  Clinical Goals: Adequate pain control  Patient Goals: healthy mom and baby  Family Goals: support patient during stay    Progress made toward(s) clinical / shift goals:    Problem: Altered Physiologic Condition  Goal: Patient physiologically stable as evidenced by normal lochia, palpable uterine involution and vitals within normal limits  Note: Fundus firm, lochia light     Problem: Early Mobilization - Post Surgery  Goal: Early mobilization post surgery  Note: Patient ambulating, tolerates well

## 2022-04-01 NOTE — PROGRESS NOTES
2000: Assessment completed, fundus firm, lochia light. Plan of care reviewed. Medication administered for pain. Patient will call if additional intervention necessary.     2330: Provided patient with electric breast pump kit. Educated on instructions of use and pumping frequency. Patient verbalized understanding.

## 2022-04-02 VITALS
DIASTOLIC BLOOD PRESSURE: 79 MMHG | WEIGHT: 165 LBS | SYSTOLIC BLOOD PRESSURE: 119 MMHG | RESPIRATION RATE: 18 BRPM | OXYGEN SATURATION: 98 % | BODY MASS INDEX: 25.9 KG/M2 | HEART RATE: 87 BPM | TEMPERATURE: 98 F | HEIGHT: 67 IN

## 2022-04-02 PROCEDURE — 700102 HCHG RX REV CODE 250 W/ 637 OVERRIDE(OP): Performed by: OBSTETRICS & GYNECOLOGY

## 2022-04-02 PROCEDURE — A9270 NON-COVERED ITEM OR SERVICE: HCPCS | Performed by: OBSTETRICS & GYNECOLOGY

## 2022-04-02 RX ORDER — ACETAMINOPHEN 500 MG
1000 TABLET ORAL EVERY 6 HOURS PRN
Qty: 30 TABLET | Refills: 0 | COMMUNITY
Start: 2022-04-02

## 2022-04-02 RX ORDER — IBUPROFEN 200 MG
800 TABLET ORAL EVERY 8 HOURS PRN
COMMUNITY
Start: 2022-04-02

## 2022-04-02 RX ORDER — PSEUDOEPHEDRINE HCL 30 MG
100 TABLET ORAL 2 TIMES DAILY PRN
Qty: 60 CAPSULE | COMMUNITY
Start: 2022-04-02

## 2022-04-02 RX ADMIN — ACETAMINOPHEN 1000 MG: 500 TABLET ORAL at 08:36

## 2022-04-02 RX ADMIN — ACETAMINOPHEN 1000 MG: 500 TABLET ORAL at 14:44

## 2022-04-02 RX ADMIN — IBUPROFEN 800 MG: 800 TABLET, FILM COATED ORAL at 00:31

## 2022-04-02 RX ADMIN — IBUPROFEN 800 MG: 800 TABLET, FILM COATED ORAL at 08:36

## 2022-04-02 RX ADMIN — DOCUSATE SODIUM 100 MG: 100 CAPSULE, LIQUID FILLED ORAL at 09:08

## 2022-04-02 ASSESSMENT — EDINBURGH POSTNATAL DEPRESSION SCALE (EPDS)
I HAVE BLAMED MYSELF UNNECESSARILY WHEN THINGS WENT WRONG: NO, NEVER
THINGS HAVE BEEN GETTING ON TOP OF ME: NO, MOST OF THE TIME I HAVE COPED QUITE WELL
I HAVE LOOKED FORWARD WITH ENJOYMENT TO THINGS: AS MUCH AS I EVER DID
I HAVE BEEN SO UNHAPPY THAT I HAVE BEEN CRYING: NO, NEVER
THE THOUGHT OF HARMING MYSELF HAS OCCURRED TO ME: NEVER
I HAVE BEEN ANXIOUS OR WORRIED FOR NO GOOD REASON: NO, NOT AT ALL
I HAVE FELT SCARED OR PANICKY FOR NO GOOD REASON: NO, NOT AT ALL
I HAVE BEEN ABLE TO LAUGH AND SEE THE FUNNY SIDE OF THINGS: AS MUCH AS I ALWAYS COULD
I HAVE FELT SAD OR MISERABLE: NO, NOT AT ALL
I HAVE BEEN SO UNHAPPY THAT I HAVE HAD DIFFICULTY SLEEPING: NOT AT ALL

## 2022-04-02 ASSESSMENT — PAIN DESCRIPTION - PAIN TYPE
TYPE: ACUTE PAIN

## 2022-04-02 NOTE — LACTATION NOTE
This note was copied from a baby's chart.  Lactation follow up:    Baby has been rooming in with parents since yesterday morning and mom reports baby has been breastfeeding very frequently and cluster fed all night long.    Baby now awake and rooting in open crib.  MOB waking up from sleep and LC offered to assist.  Mom accepted offer.  Baby rooting and latched immediately.  MOB states that her nipple are slightly sore and discussed importance of a deep latch and how to recognize.  Hand expressed a large amount of colostrum and baby now breastfeeding with sucking and swallowing observed,    Reassured parents that cluster feeding is normal in the early days of life as milk production is becoming established.      Recommended [facility] OP lactation follow up and mom would like this referral.  Referral sent via Epic.

## 2022-04-02 NOTE — DISCHARGE SUMMARY
Discharge Summary:      Ekaterina Garcia    Admit Date:   3/30/2022  Discharge Date:  2022     Admitting diagnosis:  Term pregnancy [Z34.90]  Discharge Diagnosis: Status post vaginal, spontaneous.  Pregnancy Complications: group B strep (treated)  Tubal Ligation:  no        History:  Past Medical History:   Diagnosis Date   • ASTHMA 2019    exercise, no inhalers   • Heart burn      OB History    Para Term  AB Living   1 1 1 0 0 1   SAB IAB Ectopic Molar Multiple Live Births   0 0 0   0 1      # Outcome Date GA Lbr Deion/2nd Weight Sex Delivery Anes PTL Lv   1 Term 22 39w6d 04:51 / 00:59 3.14 kg (6 lb 14.8 oz) F Vag-Spont EPI N JUAN        Sulfa drugs  Patient Active Problem List    Diagnosis Date Noted   • Term pregnancy 2022   • Closed fracture of multiple bones of right hand 2019   • Left-sided low back pain with left-sided sciatica 2016   • Family planning 10/08/2015   • Preventative health care 10/08/2015   • Epigastric pain 10/08/2015        Hospital Course:   31 y.o. , now para 1, was admitted with the above mentioned diagnosis, underwent Active Labor, vaginal, spontaneous. Patient postpartum course was unremarkable, with progressive advancement in diet , ambulation and toleration of oral analgesia. Patient without complaints today and desires discharge.      Vitals:    22 0200 22 0553 22 1800 22 0600   BP: 118/68 120/65 109/61 119/79   Pulse: 79 80 61 87   Resp: 16 18 19 18   Temp: 36.6 °C (97.9 °F) 36.4 °C (97.6 °F) 36.3 °C (97.4 °F) 36.7 °C (98 °F)   TempSrc: Temporal Temporal Temporal Temporal   SpO2: 98% 98% 100% 98%   Weight:       Height:           Current Facility-Administered Medications   Medication Dose   • oxytocin (PITOCIN) 20 UNITS/1000ML LR (induction of labor)  0.5-20 stephanie-units/min   • ondansetron (ZOFRAN) syringe/vial injection 4 mg  4 mg   • ropivacaine 0.2 % (NAROPIN) injection     • calcium carbonate (TUMS)  chewable tab 1,000 mg  1,000 mg   • famotidine (PEPCID) injection 20 mg  20 mg   • lactated ringers infusion     • docusate sodium (COLACE) capsule 100 mg  100 mg   • ibuprofen (MOTRIN) tablet 800 mg  800 mg   • acetaminophen (TYLENOL) tablet 1,000 mg  1,000 mg   • tetanus-dipth-acell pertussis (Tdap) inj 0.5 mL  0.5 mL   • oxyCODONE immediate-release (ROXICODONE) tablet 5 mg  5 mg   • LR infusion     • oxytocin (PITOCIN) infusion (for post delivery)  125 mL/hr       Exam:  Gen: NAD, AAO  Abdomen: Abdomen soft, non-tender. No masses,  No organomegally, FF @ U-1. No rebound/guarding.  Fundus Tender: No  Incision: none  Extremity: no edema, no redness or tenderness in the calves or thighs, 2+DPP, Homans sign is negative, no sign of DVT     Labs:  Recent Labs     03/30/22  2150 04/01/22  0104   WBC 10.3 11.4*   RBC 4.18* 3.21*   HEMOGLOBIN 13.9 10.8*   HEMATOCRIT 39.7 31.0*   MCV 95.0 96.6   MCH 33.3* 33.6*   MCHC 35.0 34.8   RDW 46.0 47.0   PLATELETCT 221 200   MPV 9.2 9.3        Activity:   Discharge to home  Pelvic Rest x 6 weeks    Assessment:  normal postpartum course  Discharge Assessment: No heavy bleeding or foul vaginal discharge      Follow up: 6wk with Corinne E Capurro, M.D.      Discharge Meds:   Current Outpatient Medications   Medication Sig Dispense Refill   • acetaminophen (TYLENOL) 500 MG Tab Take 2 Tablets by mouth every 6 hours as needed. 30 Tablet 0   • docusate sodium 100 MG Cap Take 100 mg by mouth 2 times a day as needed for Constipation. 60 Capsule    • ibuprofen (MOTRIN) 200 MG Tab Take 4 Tablets by mouth every 8 hours as needed.         Cricket Palma M.D.

## 2022-04-02 NOTE — PROGRESS NOTES
0830 Received morning report from DERRICK Parikh at shift change. Patient assessed and POC discussed. Patient is resting in bed. Fundus firm, lochia light.  Patient denies any needs at this time. Call light within reach, bed in lowest position. Patient is encouraged to call for pain/med interventions and any other needs.

## 2022-04-02 NOTE — DISCHARGE INSTRUCTIONS
PATIENT DISCHARGE EDUCATION INSTRUCTION SHEET  REASONS TO CALL YOUR OBSTETRICIAN  · Persistent fever, shaking, chills (Temperature higher than 100.4) may indicate you have an infection  · Heavy bleeding: soaking more than 1 pad per hour; Passing clots an egg-sized clot or bigger may mean you have an postpartum hemorrhage  · Foul odor from vagina or bad smelling or discolored discharge or blood  · Breast infection (Mastitis symptoms); breast pain, chills, fever, redness or red streaks, may feel flu like symptoms  · Urinary pain, burning or frequency  · Incision that is not healing, increased redness, swelling, tenderness or pain, or any pus from episiotomy site may mean you have an infection  · Redness, swelling, warmth, or painful to touch in the calf area of your leg may mean you have a blood clot  · Severe or intensified depression, thoughts or feelings of wanting to hurt yourself or someone else   · Pain in chest, obstructed breathing or shortness of breath (trouble catching your breath) may mean you are having a postpartum complication. Call your provider immediately   · Headache that does not get better, even after taking medicine, a bad headache with vision changes or pain in the upper right area of your belly may mean you have high blood pressure or post birth preeclampsia. Call your provider immediately    HAND WASHING  All family and friends should wash their hands:  · Before and after holding the baby  · Before feeding the baby  · After using the restroom or changing the baby's diaper    WOUND CARE  Ask your physician for additional care instructions. In general:  · Episiotomy/Laceration  · May use sasha-spray bottle, witch hazel pads and dermaplast spray for comfort  · Use sasha-spray bottle after urinating to cleanse perineal area  · To prevent burning during urination spray sasha-water bottle on labial area   · Pat perineal area dry until episiotomy/laceration is healed  · Continue to use sasha-bottle until  bleeding stops as needed  · If have a 2nd degree laceration or greater, a Sitz bath can offer relief from soreness, burning, and inflammation   · Sitz Bath   · Sit in 6 inches of warm water and soak laceration as needed until the laceration heals    VAGINAL CARE AND BLEEDING  · Nothing inside vagina for 6 weeks:   · No sexual intercourse, tampons or douching  · Bleeding may continue for 2-4 weeks. Amount and color may vary  · Soaking 1 pad or more in an hour for several hours is considered heavy bleeding  · Passing large egg sized blood clots can be concerning  · If you feel like you have heavy bleeding or are having increasing amount of blood clots call your Obstetrician immediately  · If you begin feeling faint upon standing, feeling sick to your stomach, have clammy skin, a really fast heartbeat, have chills, start feeling confused, dizzy, sleepy or weak, or feeling like you're going to faint call your Obstetrician immediately    HYPERTENSION   Preeclampsia or gestational hypertension are types of high blood pressure that only pregnant women can get. It is important for you to be aware of symptoms to seek early intervention and treatment. If you have any of these symptoms immediately call your Obstetrician    · Vision changes or blurred vision   · Severe headache or pain that is unrelieved with medication and will not go away  · Persistent pain in upper abdomen or shoulder   · Increased swelling of face, feet, or hands  · Difficulty breathing or shortness of breath at rest  · Urinating less than usual    URINATION AND BOWEL MOVEMENTS  · Eating more fiber (bran cereal, fruits, and vegetables) and drinking plenty of fluids will help to avoid constipation  · Urinary frequency and urgency after childbirth is normal  · If you experience any urinary pain, burning or frequency call your provider    BIRTH CONTROL  · It is possible to become pregnant at any time after delivery and while breastfeeding  · Plan to discuss a  "method of birth control with your physician at your post delivery follow up visit    POSTPARTUM BLUES  During the first few days after birth, you may experience a sense of the \"blues\" which may include impatience, irritability or even crying. These feelings come and go quickly. However, as many as 1 in 10 women experience emotional symptoms known as postpartum depression.     POSTPARTUM DEPRESSION    May start as early as the second or third day after delivery or take several weeks or months to develop. Symptoms of \"blues\" are present, but are more intense: Crying spells; loss of appetite; feelings of hopelessness or loss of control; fear of touching the baby; over concern or no concern at all about the baby; little or no concern about your own appearance/caring for yourself; and/or inability to sleep or excessive sleeping. Contact your Obstetrician if you are experiencing any of these symptoms     PREVENTING SHAKEN BABY  If you are angry or stressed, PUT THE BABY IN THE CRIB, step away, take some deep breaths, and wait until you are calm to care for the baby. DO NOT SHAKE THE BABY. You are not alone, call a supporter for help.  · [facility] 24/7 crisis call line (812-402-6354) or (1-750.736.8835)  · You can also text them, text \"ANSWER\" (060966)      "

## 2022-04-03 NOTE — PROGRESS NOTES
1510 Discharge instructions reviewed. Verbalized understanding. Documents signed    1815 Left facility. Escorted by staff

## 2022-04-04 ENCOUNTER — OFFICE VISIT (OUTPATIENT)
Dept: OBGYN | Facility: CLINIC | Age: 32
End: 2022-04-04
Payer: COMMERCIAL

## 2022-04-04 DIAGNOSIS — O92.5 LACTATION SUPPRESSION: ICD-10-CM

## 2022-04-04 DIAGNOSIS — O92.70 LACTATION PROBLEM: ICD-10-CM

## 2022-04-04 DIAGNOSIS — N64.59 ENGORGEMENT OF BREAST: ICD-10-CM

## 2022-04-04 DIAGNOSIS — O92.29 SORE NIPPLES DUE TO LACTATION: ICD-10-CM

## 2022-04-04 PROCEDURE — 99205 OFFICE O/P NEW HI 60 MIN: CPT | Performed by: NURSE PRACTITIONER

## 2022-04-04 NOTE — PROGRESS NOTES
Summary: baby in NBN with o2 at the start, pumping started and mom making and giving colostrum. Initiated breastfeeding after the first 24 hours, nipples sore. Home and baby hadn't stooled for almost 48 hours, parents provided donor milk with success. Mom triple feeding and is exhausted.  Today: Baby gaining very well, mom is interested in exclusively pumping. Set her spectra 9 and in 22 minutes after 6 hours of no stimulation, she removed 40ml transitional milk. Baby fed bottle easily reviewing paced feeding.   Plan:Pump with hospital grade platinum 8x/24 hours, feed back all pumped milk in addition to donor milk to infants satiation.  Follow up:   Lactation appointment :as needed   Pediatrician appointment:this week  Referrals :None  Subjective:   Susan Garcia is a 31 y.o. female here for lactation care. She is here today with baby and       Concerns:   Latch on difficulties , engorgement, nipple pain  and feeling that there is not enough milk   HPI:   Pertinent  history:   Mother does not have a history of advanced maternal age, GDM, hypertension prior to pregnancy, insulin resistance, multiple gestation, PCOS and thyroid disease. Common condition(s) which may interfere with milk supply.    Breast changes in pregnancy: Yes  History of breast surgeries: No      FEEDING HISTORY:    Past breastfeeding history:  First baby   Hospital course: baby in NBN with o2 at the start, pumping started and mom making and giving colostrum. Initiated breastfeeding after the first 24 hours, nipples sore.   Currently Baby gaining very well, mom is interested in exclusively pumping. Set her spectra 9 and in 22 minutes after 6 hours of no stimulation, she removed 40ml transitional milk. Baby fed bottle easily reviewing paced feeding.      Both breasts: Yes    Supplement: Supplementation initiated inpatient, Expressed breast milk and Donor milk  Quantity: 30ml  How given/devices:  Bottle    Nipple Shield  Use: None    Breast Pumping:   Frequency: 8x/day  Type of pump: Spectra 9  Flange size/type: 24mm  NO pain with pumping    Maternal ROS:  Constitutional: No fever, chills. Feeling well  Breasts: Soreness of breasts and Soreness of nipples  Psychiatric: Experiencing anxiety, Feels exhausted and Managing ok  Mental Health: No mention of feeling irritable, agitated, angry, apathy, exhaustion nor having sleep changes outside infant feeds/demands or appetite changes       Objective:     Maternal Physical   General: No acute distress  Breasts: Asymmetric , Full, Plugged Duct - no evidence and Mastitis  - no S/S  Nipples: abraded, erythematous and across face of nipple  Psychiatric:  Normal mood and affect. Her behavior is normal. Judgment and thought content normal   Mental Health:  Did NOT exhibit sadness, crying, feeling overwhelmed, agitation or hypervigilance.     Assessment/Plan & Lactation Counseling:     Infant exam on infant chart    Infant Weight History:   3/31/22     6#14.8oz  4/4/2022   6#10.3oz    Milk Supply Available: low  Low milk supply:   Likely due to: delay in lactogenesis II and ineffective or infrequent breast stimulation or milk removal    Maternal Diagnosis/Problem:  Lactation suppression  Lactation problem  Sore nipple due to lactation  Sore breast due to engorgement    BREASTFEEDING PLAN  Discussed concerns and symptoms as listed above in assessment and guidance summarized below.  Shared decision making was used between myself and the family for this encounter, home care, and follow up.  Topics reviewed included:  •  Herbs and medications  A galactagogue is an herb or medication taken by a breastfeeding mother to increase her milk supply. We know that for centuries mothers around the world have sought out remedies to increase their milk supply. However, there is limited research on the safety and effectiveness of herbal galactagogues, which makes it hard for us to endorse them. It is not known if  any of these herbs are truly effective, and it is difficult to predict how a specific herbal galactagogue will affect an individual, requiring “trial and error” in many situations. When effective, results are generally seen within 24-72 hours of starting an herbal galactagogue. Many of these herbs are used to decrease high blood sugar. If you are diabetic or have problems with low blood sugar, or thyroid disease  discuss the use of an herbal galactagogue with your health care provider. Not all women can increase their low milk supply with a galactagogue due to the many underlying causes of low milk production.  When taking a galactagogue, remember that frequent milk removal is still the most effective way to increase supply.    •  4th Trimester: The 12-week period immediately after mom has had the baby. Not everyone has heard of it, but every mother and their  baby will go through it. It is a time of great physical and emotional change as the baby adjusts to being outside the womb, and the family adjusts to new life as parents  o During the fourth trimester, one can expect fussiness and crying from the baby and very likely exhaustion for the family.  babies are learning to adjust to life outside the womb where it was warm and squishy!  o There is a lot of misinformation about babies and their needs, and parents are often encouraged to ignore baby's signals. Bad idea. Babies are “half-baked” at birth and have much to learn with the help of physical and emotional support from caregivers. Taking care of a baby's needs is an investment that pays off with a happier, healthier child and adult.  o It can take weeks or even months for your body to feel totally normal again. There is a major hormonal upheaval experienced by moms in the first few weeks after birth, because their bodies are shifting from many pregnancy hormones to a more normal hormonal make-up.  •  Maternal Mental Health: Having a new baby can  be joyful time for some new moms, but a difficult time for others. For all, it is a time of profound physical and emotional change. Balancing baby, family, partners and friends, work, pets, and preexisting or new life stressors as well as sleep deprivation can be extremely challenging. Untreated depression and anxiety can contribute to early cessation of breastfeeding, so it is important both for the mental health and physical health of new moms and babies to get on the path to feeling better.   •  Sleep or lack of: discussed strategies to manage restorative sleep, although short amounts, significant to the mental health of the mother.   o Mom goes to sleep right after 8pm +/- feeding  o Partner covers first late evening feeding (10pm/11pm), settles baby,  then goes to bed  o Mom covers next feeding 1am /2am, partner sleeps  Next feeding share  • Milk supply is dependent on glandular tissue development, hormonal influences, how many times the baby removes milk and how well the breasts are emptied in a 24 hour period. This is a biological reality that we can NOT work around. If, for any reason, your baby is not latching, or you are not able to nurse, then it is important for you to remove the milk instead by pumping or hand expression.  There's no magic trick, tea, food, drink, cookie or supplement that will increase your milk supply. One  must  effectively remove milk to continue to make and maximize milk. In the early days and weeks that can be 8+ times in 24 hours. For older babies, on average 6-7 + times in 24 hours.      • Low Milk Supply: Causes  o Lack of nipple/breast stimulation (Baby at the breast but not suckling, mostly non nutritive sucking)  o Lack of breast emptying (Baby at the breast but no removing much milk)  •  Feeding:   o Feed your baby every 1.5-3 hours, more often if baby acts hungry.   o Awaken baby for feeding if going over 3 hours in the day.   o Until back to birth weight, ONE four hour at  night is acceptable if has had 8 prior feedings in 24 hours.    o Daily goal: 8-12 feedings per 24 hours.   •  Supplement:   • Supplement with expressed milk and donor milk    •  When bottle-feeding, there are three primary things to consider:    o Nipple Shape:  - Look for a nipple that looks like a “breast at work” not a “breast at rest.”  A “breast at work” has a somewhat cone shape as the nipple and breast tissue is pulled into the baby’s mouth while feeding.  Your baby’s mouth should be able to go around the widest part of the nipple to form a wide-open gape on the bottle like that of a good latch at the breast. In contrast, a breast at rest might look more like, well, a breast: a roundish base with a  nipple.  If the bottle looks like this, your baby’s lips may not be able to get around the widest part of the nipple because it is just too wide resulting in a narrow gape that would hurt your nipple. This nipple shape may also make it difficult for your baby to make a complete seal with their lips which leads to air intake and milk spillage.Wide or narrow neck bottles are your choice.   o Flow Rate of the Nipple:  - The nipple flow should be slow.  Don’t just read the label, but notice how your baby is feeding and trust what you observe.  A study done a few years ago found that “slow flow” varied widely between brands and even between nipples of the same brand.  Try several nipples until you find one that results in a rhythmic sucking pattern but not chugging and gulping.  o Pacing the feeding:  - A slow flow nipple helps, but how you feed the baby is more important.  Good positioning can compensate for a faster flow nipple.  When bottle-feeding, the baby should control how much is consumed at a feeding.  Holding the baby in an upright position with the bottle horizontal ensures that the baby gets milk only when sucking.  Here is a nice video demonstrating this concept of paced bottle feeding,   https://www.[employer].com/watch?v=IzXEM4hJL5E    •  Pacifier Use:  The American Academy of Pediatrics' Position Paper reports: Although we recommend a conservative approach regarding pacifier use, we do not endorse a complete ban on the use of pacifiers, nor do we support an approach that induces parental guilt concerning their choices about the use of pacifiers.  •  Pumping Guidelines:  o Both breasts   o Sustaining a healthy baseline prolactin level is vital- this means feeding/pumping at least every 3 hours with no more than a 5 hour break at night.   o Pumping is effective but can quickly exhaust and overwhelm a new mother  o Pump 8 times in 24 hours  o BRA    - Consider a hands free bra - [employer] is recommended.  o Type of pump:  - Platinum and Spectra  [place] Settings http://www.[place]/healthcare-professionals/videos/ameda-platinum-with-hygienikit-video  1. Speed: Start at 80 then turn down to 60 after 1.5-2 minutes when you see milk in the flange channel  2. Suction: To comfort, goal of  31%. NEVER to discomfort.   Spectra 9 Settings  1.  Press letdown button when first starting         § Cycle: 70 / Vacuum: L1 - L 5 (To comfort)  2. Once milk lets down, press letdown button again  §  Vacuum: L1 - L12 (To comfort) You don't have cycle control.   - Always double pump  o How long will vary woman to woman, typically 8-15 minutes, or 1-2 minutes after flow slows  o Flange Fit: Freely moving nipple in the tunnel with some movement of the areola.  - Today's evaluation indicates appropriate flange after pumping for 10 minutes, as breast soften, the 24mm may be appropriate, will need to be followed.   Caution with breast massage The word “Massage” means so many different things in the breastfeeding world. It is described and interpreted in so many different ways and unfortunately potentially harmful. The hands can be safe on a breast and  gentle to help in many ways but they also can be damaging when  used in the wrong way.   Lymphatic drainage massage is appropriate,  open hand , lightly stroking only. And can be highly effective. The massage advice to knead , massage deeply , vibrate with marketed tools is  most concerning.     • Storage (Acceptable guidelines for healthy term babies)  o 10 hours at room temp including your pieces, may rinse but not mandatory  o 8 days refrigerator, don't need  to refrigerate right away if using fresh pumped milk at the next feeding  o Freezer 1 year  o Deep freezer 2 years  o American Academy or Pediatrics has said you may mix different temperature milks.   o If baby drinks breastmilk from a fresh or refrigerated bottle of breastmilk,  you may return the unused portion to the refrigerator and use once at next feeding.      Increasing supply besides Galactogogues and Pumping:   o Warmth  o Relaxation   o Physical, auditory narratives  o Childbirth relaxation Techniques  o Acupuncture and acupressure  - [facility] [email or web address]  - [facility] [email or web address]    • Nipple care:  • May apply breastmilk  • Moist-oily ointment after feeding/pumping, ie Lanolin nipple butter, coconut or olive oil, if desired/needed 2-3 times/day until nipples are healed  • You do not need to wash this off before pumping or feeding the baby     Breast Care Engorgement, Full Breasts   Heat prior to the feeding   Cold after, 20 minutes on, 20 off, repeat as desired each feeding   GENTLE vibration or massage but not necessary   Ibuprofen/advil/motrin 600mg with food, even a cracker, every 6 hours for 48-72 hours for inflammation    •  Connect with other mothers:  o Facebook:   - [place] [facility]: https://www.facebook.com/[place].breastfeeds/  - Well-Nourished Babies (Private group for questions and support): https://www.facebook.com/groups/576922926976778/  o Breastfeeding [place] [facility]  WEIGHT CHECKS  - Tuesdays 10am - 11am. [facility],  [address], 3rd floor conference room  - Check your baby's weight, do a feeding and see how your baby is growing, visit with other mothers, plan on a walk or coffee date after group.  • Please wear a mask  • Due to space limitations - no strollers please (New c/section moms should use the stroller)  • We would love to have dads stay, but moms won't breastfeed if there are men in the room.  • The room is only available from 10am -11am, there is often a meeting prior and after.   • All diapers must be taken with you     In Conclusion:   Family present has verbalized what they can realistically do based on family dynamics, understanding a plan has to be doable to be effective and can be renegotiated at any time.  This is a complex and intimate journey. When obstacles present themselves, it takes confidence, persistence and support. You are now the focus of our Breastfeeding Medicine team; we are here to support your decisions and goals.      Follow up requires close monitoring in this time sensitive window of opportunity to establish milk supply and facilitate the learning of  breastfeeding.    Mom is encouraged to e-mail to update how the plan is working.    Pediatrician appointment: this week    Follow-up for infant weight check and dyad breastfeeding evaluation in 2 week(s)  Please call [phone] if you have not scheduled your next appointment    A total of 70 minutes, not including infant assessment time, with more than 50% was spent preparing to see the patient, obtaining and reviewing separately obtained history, performing a medically appropriate examination and evaluation, counseling and educating the family,  documenting clinical information in the electronic health record, independently interpreting weighted feeds and infant growth results, communicating these results to the family and care coordination as detailed in the above note.       NATALY Yoon.

## 2022-04-12 ENCOUNTER — OFFICE VISIT (OUTPATIENT)
Dept: OBGYN | Facility: CLINIC | Age: 32
End: 2022-04-12
Payer: COMMERCIAL

## 2022-04-12 DIAGNOSIS — L53.9 BREAST ERYTHEMA: ICD-10-CM

## 2022-04-12 DIAGNOSIS — O92.5 LACTATION SUPPRESSION: ICD-10-CM

## 2022-04-12 PROCEDURE — 99215 OFFICE O/P EST HI 40 MIN: CPT | Performed by: NURSE PRACTITIONER

## 2022-04-12 NOTE — PROGRESS NOTES
Summary:Mom exclusively pumping, right breast had always made more and starting to equal out to left lower side. Today other breast skin issues have resolved but noted right breast UOQ redness. Pumping using Spectra 9 on occasion, predominately the Platinum. Has donor milk using with her pumped milk. Today: Baby normal interval growth at ped visit 22. Pumped after 3 hours and obtained about an ounce on each side. Redness not resolved but firmness did.  Plan Precautions for mastitis discussed as well as cause of onset.   Follow up:   Lactation appointment :as needed   Pediatrician appointment:4/15/22  Referrals :None  Subjective:   Susan Garcia is a 31 y.o. female here for lactation care. She is here today with baby and     Concerns:   Pumping evaluation    HPI:   Pertinent  history:   Mother does not have a history of advanced maternal age, GDM, hypertension prior to pregnancy, insulin resistance, multiple gestation, PCOS and thyroid disease. Common condition(s) which may interfere with milk supply.    Breast changes in pregnancy: Yes  History of breast surgeries: No      FEEDING HISTORY:    Past breastfeeding history:  First baby   Hospital course: baby in NBN with o2 at the start, pumping started and mom making and giving colostrum. Initiated breastfeeding after the first 24 hours, nipples sore.   Currently Baby gaining very well, mom is interested in exclusively pumping. Set her spectra 9 and in 22 minutes after 6 hours of no stimulation, she removed 40ml transitional milk. Baby fed bottle easily reviewing paced feeding.      Both breasts: Not breastfeeding    Supplement: Supplementation initiated inpatient, Expressed breast milk and Donor milk  Quantity: 60-90ml as baby desires  How given/devices:  Bottle    Nipple Shield Use: None    Breast Pumping:   Frequency: 7-8x/day  Type of pump: Spectra 9 and Platinum  Flange size/type: 24mm  NO pain with pumping    Maternal  ROS:  Constitutional: No fever, chills. Feeling well  Breasts: Soreness of breasts and Soreness of nipples resolved. Redness on UOQ right noted  Psychiatric: Experiencing anxiety, Feels exhausted and Managing ok  Mental Health: No mention of feeling irritable, agitated, angry, apathy, exhaustion nor having sleep changes outside infant feeds/demands or appetite changes       Objective:     Maternal Physical   General: No acute distress  Breasts: Asymmetric , Dense, Full ready to pump, Plugged Duct - no evidence.  Mastitis no fever, chills, but redness on the UOQ right, firmness reduced with pumping  Nipples: Intact  Psychiatric:  Normal mood and affect. Her behavior is normal. Judgment and thought content normal   Mental Health:  Did NOT exhibit sadness, crying, feeling overwhelmed, agitation or hypervigilance.     Assessment/Plan & Lactation Counseling:     Infant Weight History:   3/31/22     6#14.8oz  4/4/2022   6#10.3oz  4/6/22 6#12.0oz Ped office  4/12/2022  Baby sleeping not weighted or evaluated    Milk Supply Available: Not full production   Low milk supply:   Likely due to: ineffective or infrequent breast stimulation or milk removal    Maternal Diagnosis/Problem:  Lactation suppression  Breast erythema    BREASTFEEDING PLAN  Discussed concerns and symptoms as listed above in assessment and guidance summarized below.  Shared decision making was used between myself and the family for this encounter, home care, and follow up.  Topics reviewed included:    •  Sleep or lack of:   - Utilizing family and sharing covering for each other  • Milk supply is dependent on glandular tissue development, hormonal influences, how many times the baby removes milk and how well the breasts are emptied in a 24 hour period. This is a biological reality that we can NOT work around. If, for any reason, your baby is not latching, or you are not able to nurse, then it is important for you to remove the milk instead by pumping or  hand expression.  There's no magic trick, tea, food, drink, cookie or supplement that will increase your milk supply. One  must  effectively remove milk to continue to make and maximize milk. In the early days and weeks that can be 8+ times in 24 hours. For older babies, on average 6-7 + times in 24 hours.    • Low Milk Supply: Causes  o Lack of nipple/breast stimulation (Baby at the breast but not suckling, mostly non nutritive sucking)  o Lack of breast emptying (Baby at the breast but no removing much milk)  • Through this experience, parents decide what is reasonable considering all the factors around breastfeeding and decide pumping/stimulation that is appropriate for their situation. That is ALWAYS the best decision.   •  Feeding:   o Managing well based on infant growth.   - Feed your baby every 1.5-3 hours, more often if baby acts hungry.   - Awaken baby for feeding if going over 3 hours in the day.     - Daily goal: 8-12 feedings per 24 hours.  - May allow baby to go 5 hours at night for parental sleep   •  Supplement:   • Supplement with expressed milk and donor milk  •  Pumping Guidelines:  o Both breasts   o Pumping is effective but can quickly exhaust and overwhelm a new mother  o Pump 8 times in 24 hours  o BRA    - Consider a hands free bra - [employer] is recommended.  o Type of pump:  - Platinum and Spectra  [employer] [place] Settings [email or web address]-[email or web address]-platinum-[email or web address]-hygienikit-video  1. Speed: Start at 80 then turn down to 60 after 1.5-2 minutes when you see milk in the flange channel  2. Suction: To comfort, goal of  31%. NEVER to discomfort.   Spectra 9 Settings  1.  Press letdown button when first starting         § Cycle: 70 / Vacuum: L1 - L 5 (To comfort)  2. Once milk lets down, press letdown button again  §  Vacuum: L1 - L12 (To comfort) You don't have cycle control.  § Once you let down, turn off stimulation phase, it makes pumping time less    - Always double pump  o How long will vary woman to woman, typically 8-15 minutes, or 1-2 minutes after flow slows. Today it was 6.30 minutes for you  o Flange Fit: Freely moving nipple in the tunnel with some movement of the areola.  Today's evaluation indicates appropriate flange appropriate  Caution with breast massage The word “Massage” means so many different things in the breastfeeding world. It is described and interpreted in so many different ways and unfortunately potentially harmful. The hands can be safe on a breast and  gentle to help in many ways but they also can be damaging when used in the wrong way.   Lymphatic drainage massage is appropriate,  open hand , lightly stroking only. And can be highly effective. The massage advice to knead , massage deeply , vibrate with marketed tools is  most concerning.     • Storage (Acceptable guidelines for healthy term babies)  o 10 hours at room temp including your pieces, may rinse but not mandatory  o 8 days refrigerator, don't need  to refrigerate right away if using fresh pumped milk at the next feeding  o Freezer 1 year  o Deep freezer 2 years  o American Academy or Pediatrics has said you may mix different temperature milks.   o If baby drinks breastmilk from a fresh or refrigerated bottle of breastmilk,  you may return the unused portion to the refrigerator and use once at next feeding.      Increasing supply besides Galactogogues and Pumping:   o Warmth  o Relaxation   o Physical, auditory narratives  o Childbirth relaxation Techniques  o Acupuncture and acupressure  - [facility] [email or web address]/  - Yahir Agustin [email or web address]     Breast Care Full Breasts Redness   Heat prior to the feeding   Cold after, 20 minutes on, 20 off, repeat as desired each feeding   GENTLE vibration or massage but not necessary   Ibuprofen/advil/motrin 600mg with food, even a cracker, every 6 hours for 48-72 hours for  inflammation    Mastitis is an inflammation of the breast that can be caused by obstruction, infection and/or allergy. The incidence of postpartum mastitis in Western women is 20%; mastitis is not nearly so common in countries where breastfeeding is the norm and frequent breastfeeding is typical. Mastitis is most common in the first 2-3 weeks, but can occur at any stage of lactation. Mastitis may come on abruptly, and usually affects only one breast.   Local symptoms are the same as for a plugged duct, but the pain/heat/swelling is usually more intense. There may be red streaks extending outward from the affected area. Typical mastitis symptoms include a fever of 101.3°F (38.5°C) or greater, chills, flu-like aching, malaise and systemic illness   Deep massage doesn’t relieve plugs  Women may massage their breasts deeply and vigorously in an attempt to relieve a plug.  This causes tissue trauma, bleeding in small vessels, and edema (swelling and fluid accumulation).  A more effective approach to relieving obstruction is gently mobilizing congested lymphatic fluid via lymphatic massage.   Causes for mom  Milk stasis (usually primary cause)  Milk isn't being moved out  Stress, fatigue, anemia, weakened immunity  Antibiotic?  · No: If symptoms are mild and have been present for less than 24 hours.  · May have two cloves of raw garlic at the onset of symptoms  · Yes: If symptoms are not improving in 12-24 hours, or if mom is acutely ill.  · Most common pathogen is penicillin-resistant Staphylococcus aureus.  · Typical antibiotics used for mastitis:- Dicloxacillin,   • Most recommend 10-14 day treatment to prevent relapse. Do not discontinue treatment earlier than prescribed. In no improvement in 48 hours, call back the prescribing provider and report.    •  Connect with other mothers:  o Breastfeeding [place] [facility]  WEIGHT CHECKS  - Tuesdays 10am - 11am. [facility] at [facility] [place], [facility] [address], 3rd floor conference  room  - Check your baby's weight, do a feeding and see how your baby is growing, visit with other mothers, plan on a walk or coffee date after group.  • Please wear a mask  • Due to space limitations - no strollers please (New c/section moms should use the stroller)  • We would love to have dads stay, but moms won't breastfeed if there are men in the room.  • The room is only available from 10am -11am, there is often a meeting prior and after.   • All diapers must be taken with you        Follow up     Mom is encouraged to e-mail to update how the plan is working.  Please call [phone] if you have not scheduled your next appointment    A total of 55 minutes, not including infant assessment time, with more than 50% was spent preparing to see the patient, obtaining and reviewing separately obtained history, performing a medically appropriate examination and evaluation, counseling and educating the family,  documenting clinical information in the electronic health record, independently interpreting weighted feeds and infant growth results, communicating these results to the family and care coordination as detailed in the above note.       NATALY Yoon.

## 2023-04-18 ENCOUNTER — APPOINTMENT (RX ONLY)
Dept: URBAN - METROPOLITAN AREA CLINIC 22 | Facility: CLINIC | Age: 33
Setting detail: DERMATOLOGY
End: 2023-04-18

## 2023-04-18 DIAGNOSIS — Z71.89 OTHER SPECIFIED COUNSELING: ICD-10-CM

## 2023-04-18 DIAGNOSIS — L738 OTHER SPECIFIED DISEASES OF HAIR AND HAIR FOLLICLES: ICD-10-CM | Status: RESOLVING

## 2023-04-18 DIAGNOSIS — L73.9 FOLLICULAR DISORDER, UNSPECIFIED: ICD-10-CM | Status: RESOLVING

## 2023-04-18 DIAGNOSIS — L663 OTHER SPECIFIED DISEASES OF HAIR AND HAIR FOLLICLES: ICD-10-CM | Status: RESOLVING

## 2023-04-18 DIAGNOSIS — L81.4 OTHER MELANIN HYPERPIGMENTATION: ICD-10-CM

## 2023-04-18 DIAGNOSIS — L70.0 ACNE VULGARIS: ICD-10-CM

## 2023-04-18 DIAGNOSIS — D22 MELANOCYTIC NEVI: ICD-10-CM

## 2023-04-18 PROBLEM — D22.5 MELANOCYTIC NEVI OF TRUNK: Status: ACTIVE | Noted: 2023-04-18

## 2023-04-18 PROBLEM — D23.72 OTHER BENIGN NEOPLASM OF SKIN OF LEFT LOWER LIMB, INCLUDING HIP: Status: ACTIVE | Noted: 2023-04-18

## 2023-04-18 PROBLEM — L02.32 FURUNCLE OF BUTTOCK: Status: ACTIVE | Noted: 2023-04-18

## 2023-04-18 PROCEDURE — ? PRESCRIPTION

## 2023-04-18 PROCEDURE — ? SUNSCREEN TREATMENT REGIMEN

## 2023-04-18 PROCEDURE — 99213 OFFICE O/P EST LOW 20 MIN: CPT

## 2023-04-18 PROCEDURE — ? COUNSELING

## 2023-04-18 RX ORDER — TRETIONIN 0.25 MG/G
1 CREAM TOPICAL QD
Qty: 45 | Refills: 2 | Status: ERX | COMMUNITY
Start: 2023-04-18

## 2023-04-18 RX ORDER — CLINDAMYCIN PHOSPHATE 10 MG/ML
1 SOLUTION TOPICAL BID
Qty: 60 | Refills: 6 | Status: ERX | COMMUNITY
Start: 2023-04-18

## 2023-04-18 RX ADMIN — CLINDAMYCIN PHOSPHATE 1: 10 SOLUTION TOPICAL at 00:00

## 2023-04-18 RX ADMIN — TRETIONIN 1: 0.25 CREAM TOPICAL at 00:00

## 2023-04-18 ASSESSMENT — LOCATION SIMPLE DESCRIPTION DERM
LOCATION SIMPLE: INFERIOR FOREHEAD
LOCATION SIMPLE: CHIN
LOCATION SIMPLE: LEFT BUTTOCK
LOCATION SIMPLE: SUPERIOR FOREHEAD
LOCATION SIMPLE: RIGHT FOREARM
LOCATION SIMPLE: LEFT FOREARM
LOCATION SIMPLE: UPPER BACK

## 2023-04-18 ASSESSMENT — LOCATION ZONE DERM
LOCATION ZONE: FACE
LOCATION ZONE: ARM
LOCATION ZONE: TRUNK

## 2023-04-18 ASSESSMENT — LOCATION DETAILED DESCRIPTION DERM
LOCATION DETAILED: SUPERIOR THORACIC SPINE
LOCATION DETAILED: LEFT VENTRAL PROXIMAL FOREARM
LOCATION DETAILED: LEFT BUTTOCK
LOCATION DETAILED: RIGHT CHIN
LOCATION DETAILED: INFERIOR MID FOREHEAD
LOCATION DETAILED: SUPERIOR MID FOREHEAD
LOCATION DETAILED: RIGHT VENTRAL PROXIMAL FOREARM

## 2023-04-18 NOTE — PROCEDURE: COUNSELING
Detail Level: Generalized
Detail Level: Zone
Aklief counseling:  Patient advised to apply a pea-sized amount only at bedtime and wait 30 minutes after washing their face before applying.  If too drying, patient may add a non-comedogenic moisturizer.  The most commonly reported side effects including irritation, redness, scaling, dryness, stinging, burning, itching, and increased risk of sunburn.  The patient verbalized understanding of the proper use and possible adverse effects of retinoids.  All of the patient's questions and concerns were addressed.
Topical Retinoid Pregnancy And Lactation Text: This medication is Pregnancy Category C. It is unknown if this medication is excreted in breast milk.
Doxycycline Counseling:  Patient counseled regarding possible photosensitivity and increased risk for sunburn.  Patient instructed to avoid sunlight, if possible.  When exposed to sunlight, patients should wear protective clothing, sunglasses, and sunscreen.  The patient was instructed to call the office immediately if the following severe adverse effects occur:  hearing changes, easy bruising/bleeding, severe headache, or vision changes.  The patient verbalized understanding of the proper use and possible adverse effects of doxycycline.  All of the patient's questions and concerns were addressed.
Minocycline Pregnancy And Lactation Text: This medication is Pregnancy Category D and not consider safe during pregnancy. It is also excreted in breast milk.
Winlevi Counseling:  I discussed with the patient the risks of topical clascoterone including but not limited to erythema, scaling, itching, and stinging. Patient voiced their understanding.
Tetracycline Counseling: Patient counseled regarding possible photosensitivity and increased risk for sunburn.  Patient instructed to avoid sunlight, if possible.  When exposed to sunlight, patients should wear protective clothing, sunglasses, and sunscreen.  The patient was instructed to call the office immediately if the following severe adverse effects occur:  hearing changes, easy bruising/bleeding, severe headache, or vision changes.  The patient verbalized understanding of the proper use and possible adverse effects of tetracycline.  All of the patient's questions and concerns were addressed. Patient understands to avoid pregnancy while on therapy due to potential birth defects.
Benzoyl Peroxide Pregnancy And Lactation Text: This medication is Pregnancy Category C. It is unknown if benzoyl peroxide is excreted in breast milk.
Topical Sulfur Applications Counseling: Topical Sulfur Counseling: Patient counseled that this medication may cause skin irritation or allergic reactions.  In the event of skin irritation, the patient was advised to reduce the amount of the drug applied or use it less frequently.   The patient verbalized understanding of the proper use and possible adverse effects of topical sulfur application.  All of the patient's questions and concerns were addressed.
Detail Level: Simple
Dapsone Counseling: I discussed with the patient the risks of dapsone including but not limited to hemolytic anemia, agranulocytosis, rashes, methemoglobinemia, kidney failure, peripheral neuropathy, headaches, GI upset, and liver toxicity.  Patients who start dapsone require monitoring including baseline LFTs and weekly CBCs for the first month, then every month thereafter.  The patient verbalized understanding of the proper use and possible adverse effects of dapsone.  All of the patient's questions and concerns were addressed.
High Dose Vitamin A Pregnancy And Lactation Text: High dose vitamin A therapy is contraindicated during pregnancy and breast feeding.
Spironolactone Counseling: Patient advised regarding risks of diarrhea, abdominal pain, hyperkalemia, birth defects (for female patients), liver toxicity and renal toxicity. The patient may need blood work to monitor liver and kidney function and potassium levels while on therapy. The patient verbalized understanding of the proper use and possible adverse effects of spironolactone.  All of the patient's questions and concerns were addressed.
Birth Control Pills Counseling: Birth Control Pill Counseling: I discussed with the patient the potential side effects of OCPs including but not limited to increased risk of stroke, heart attack, thrombophlebitis, deep venous thrombosis, hepatic adenomas, breast changes, GI upset, headaches, and depression.  The patient verbalized understanding of the proper use and possible adverse effects of OCPs. All of the patient's questions and concerns were addressed.
Isotretinoin Pregnancy And Lactation Text: This medication is Pregnancy Category X and is considered extremely dangerous during pregnancy. It is unknown if it is excreted in breast milk.
Sarecycline Counseling: Patient advised regarding possible photosensitivity and discoloration of the teeth, skin, lips, tongue and gums.  Patient instructed to avoid sunlight, if possible.  When exposed to sunlight, patients should wear protective clothing, sunglasses, and sunscreen.  The patient was instructed to call the office immediately if the following severe adverse effects occur:  hearing changes, easy bruising/bleeding, severe headache, or vision changes.  The patient verbalized understanding of the proper use and possible adverse effects of sarecycline.  All of the patient's questions and concerns were addressed.
Aklief Pregnancy And Lactation Text: It is unknown if this medication is safe to use during pregnancy.  It is unknown if this medication is excreted in breast milk.  Breastfeeding women should use the topical cream on the smallest area of the skin for the shortest time needed while breastfeeding.  Do not apply to nipple and areola.
Tazorac Counseling:  Patient advised that medication is irritating and drying.  Patient may need to apply sparingly and wash off after an hour before eventually leaving it on overnight.  The patient verbalized understanding of the proper use and possible adverse effects of tazorac.  All of the patient's questions and concerns were addressed.
Winlevi Pregnancy And Lactation Text: This medication is considered safe during pregnancy and breastfeeding.
Bactrim Counseling:  I discussed with the patient the risks of sulfa antibiotics including but not limited to GI upset, allergic reaction, drug rash, diarrhea, dizziness, photosensitivity, and yeast infections.  Rarely, more serious reactions can occur including but not limited to aplastic anemia, agranulocytosis, methemoglobinemia, blood dyscrasias, liver or kidney failure, lung infiltrates or desquamative/blistering drug rashes.
Azelaic Acid Pregnancy And Lactation Text: This medication is considered safe during pregnancy and breast feeding.
Erythromycin Pregnancy And Lactation Text: This medication is Pregnancy Category B and is considered safe during pregnancy. It is also excreted in breast milk.
Topical Clindamycin Counseling: Patient counseled that this medication may cause skin irritation or allergic reactions.  In the event of skin irritation, the patient was advised to reduce the amount of the drug applied or use it less frequently.   The patient verbalized understanding of the proper use and possible adverse effects of clindamycin.  All of the patient's questions and concerns were addressed.
Dapsone Pregnancy And Lactation Text: This medication is Pregnancy Category C and is not considered safe during pregnancy or breast feeding.
Use Enhanced Medication Counseling?: No
Doxycycline Pregnancy And Lactation Text: This medication is Pregnancy Category D and not consider safe during pregnancy. It is also excreted in breast milk but is considered safe for shorter treatment courses.
Azithromycin Counseling:  I discussed with the patient the risks of azithromycin including but not limited to GI upset, allergic reaction, drug rash, diarrhea, and yeast infections.
Birth Control Pills Pregnancy And Lactation Text: This medication should be avoided if pregnant and for the first 30 days post-partum.
High Dose Vitamin A Counseling: Side effects reviewed, pt to contact office should one occur.
Minocycline Counseling: Patient advised regarding possible photosensitivity and discoloration of the teeth, skin, lips, tongue and gums.  Patient instructed to avoid sunlight, if possible.  When exposed to sunlight, patients should wear protective clothing, sunglasses, and sunscreen.  The patient was instructed to call the office immediately if the following severe adverse effects occur:  hearing changes, easy bruising/bleeding, severe headache, or vision changes.  The patient verbalized understanding of the proper use and possible adverse effects of minocycline.  All of the patient's questions and concerns were addressed.
Topical Retinoid counseling:  Patient advised to apply a pea-sized amount only at bedtime and wait 30 minutes after washing their face before applying.  If too drying, patient may add a non-comedogenic moisturizer. The patient verbalized understanding of the proper use and possible adverse effects of retinoids.  All of the patient's questions and concerns were addressed.
Topical Sulfur Applications Pregnancy And Lactation Text: This medication is Pregnancy Category C and has an unknown safety profile during pregnancy. It is unknown if this topical medication is excreted in breast milk.
Bactrim Pregnancy And Lactation Text: This medication is Pregnancy Category D and is known to cause fetal risk.  It is also excreted in breast milk.
Topical Clindamycin Pregnancy And Lactation Text: This medication is Pregnancy Category B and is considered safe during pregnancy. It is unknown if it is excreted in breast milk.
Isotretinoin Counseling: Patient should get monthly blood tests, not donate blood, not drive at night if vision affected, not share medication, and not undergo elective surgery for 6 months after tx completed. Side effects reviewed, pt to contact office should one occur.
Spironolactone Pregnancy And Lactation Text: This medication can cause feminization of the male fetus and should be avoided during pregnancy. The active metabolite is also found in breast milk.
Benzoyl Peroxide Counseling: Patient counseled that medicine may cause skin irritation and bleach clothing.  In the event of skin irritation, the patient was advised to reduce the amount of the drug applied or use it less frequently.   The patient verbalized understanding of the proper use and possible adverse effects of benzoyl peroxide.  All of the patient's questions and concerns were addressed.
Azithromycin Pregnancy And Lactation Text: This medication is considered safe during pregnancy and is also secreted in breast milk.
Erythromycin Counseling:  I discussed with the patient the risks of erythromycin including but not limited to GI upset, allergic reaction, drug rash, diarrhea, increase in liver enzymes, and yeast infections.
Azelaic Acid Counseling: Patient counseled that medicine may cause skin irritation and to avoid applying near the eyes.  In the event of skin irritation, the patient was advised to reduce the amount of the drug applied or use it less frequently.   The patient verbalized understanding of the proper use and possible adverse effects of azelaic acid.  All of the patient's questions and concerns were addressed.
Tazorac Pregnancy And Lactation Text: This medication is not safe during pregnancy. It is unknown if this medication is excreted in breast milk.
Detail Level: Detailed

## 2024-04-25 ENCOUNTER — APPOINTMENT (RX ONLY)
Dept: URBAN - METROPOLITAN AREA CLINIC 22 | Facility: CLINIC | Age: 34
Setting detail: DERMATOLOGY
End: 2024-04-25

## 2024-04-25 DIAGNOSIS — Z71.89 OTHER SPECIFIED COUNSELING: ICD-10-CM

## 2024-04-25 DIAGNOSIS — D22 MELANOCYTIC NEVI: ICD-10-CM

## 2024-04-25 DIAGNOSIS — L70.0 ACNE VULGARIS: ICD-10-CM

## 2024-04-25 DIAGNOSIS — L81.4 OTHER MELANIN HYPERPIGMENTATION: ICD-10-CM

## 2024-04-25 PROBLEM — D22.5 MELANOCYTIC NEVI OF TRUNK: Status: ACTIVE | Noted: 2024-04-25

## 2024-04-25 PROBLEM — D23.72 OTHER BENIGN NEOPLASM OF SKIN OF LEFT LOWER LIMB, INCLUDING HIP: Status: ACTIVE | Noted: 2024-04-25

## 2024-04-25 PROBLEM — D23.71 OTHER BENIGN NEOPLASM OF SKIN OF RIGHT LOWER LIMB, INCLUDING HIP: Status: ACTIVE | Noted: 2024-04-25

## 2024-04-25 PROCEDURE — ? SUNSCREEN RECOMMENDATIONS

## 2024-04-25 PROCEDURE — 99213 OFFICE O/P EST LOW 20 MIN: CPT

## 2024-04-25 PROCEDURE — ? TREATMENT REGIMEN

## 2024-04-25 PROCEDURE — ? COUNSELING

## 2024-04-25 ASSESSMENT — LOCATION DETAILED DESCRIPTION DERM
LOCATION DETAILED: RIGHT MEDIAL SUPERIOR CHEST
LOCATION DETAILED: RIGHT CHIN
LOCATION DETAILED: LEFT VENTRAL PROXIMAL FOREARM
LOCATION DETAILED: SUPERIOR MID FOREHEAD
LOCATION DETAILED: RIGHT VENTRAL PROXIMAL FOREARM
LOCATION DETAILED: INFERIOR MID FOREHEAD
LOCATION DETAILED: SUPERIOR THORACIC SPINE

## 2024-04-25 ASSESSMENT — LOCATION SIMPLE DESCRIPTION DERM
LOCATION SIMPLE: SUPERIOR FOREHEAD
LOCATION SIMPLE: RIGHT FOREARM
LOCATION SIMPLE: UPPER BACK
LOCATION SIMPLE: LEFT FOREARM
LOCATION SIMPLE: CHEST
LOCATION SIMPLE: CHIN
LOCATION SIMPLE: INFERIOR FOREHEAD

## 2024-04-25 ASSESSMENT — LOCATION ZONE DERM
LOCATION ZONE: FACE
LOCATION ZONE: TRUNK
LOCATION ZONE: ARM

## 2024-04-25 NOTE — PROCEDURE: COUNSELING
Detail Level: Generalized
Detail Level: Zone
Detail Level: Detailed
Aklief counseling:  Patient advised to apply a pea-sized amount only at bedtime and wait 30 minutes after washing their face before applying.  If too drying, patient may add a non-comedogenic moisturizer.  The most commonly reported side effects including irritation, redness, scaling, dryness, stinging, burning, itching, and increased risk of sunburn.  The patient verbalized understanding of the proper use and possible adverse effects of retinoids.  All of the patient's questions and concerns were addressed.
Topical Retinoid Pregnancy And Lactation Text: This medication is Pregnancy Category C. It is unknown if this medication is excreted in breast milk.
Doxycycline Counseling:  Patient counseled regarding possible photosensitivity and increased risk for sunburn.  Patient instructed to avoid sunlight, if possible.  When exposed to sunlight, patients should wear protective clothing, sunglasses, and sunscreen.  The patient was instructed to call the office immediately if the following severe adverse effects occur:  hearing changes, easy bruising/bleeding, severe headache, or vision changes.  The patient verbalized understanding of the proper use and possible adverse effects of doxycycline.  All of the patient's questions and concerns were addressed.
Minocycline Pregnancy And Lactation Text: This medication is Pregnancy Category D and not consider safe during pregnancy. It is also excreted in breast milk.
Winlevi Counseling:  I discussed with the patient the risks of topical clascoterone including but not limited to erythema, scaling, itching, and stinging. Patient voiced their understanding.
Tetracycline Counseling: Patient counseled regarding possible photosensitivity and increased risk for sunburn.  Patient instructed to avoid sunlight, if possible.  When exposed to sunlight, patients should wear protective clothing, sunglasses, and sunscreen.  The patient was instructed to call the office immediately if the following severe adverse effects occur:  hearing changes, easy bruising/bleeding, severe headache, or vision changes.  The patient verbalized understanding of the proper use and possible adverse effects of tetracycline.  All of the patient's questions and concerns were addressed. Patient understands to avoid pregnancy while on therapy due to potential birth defects.
Benzoyl Peroxide Pregnancy And Lactation Text: This medication is Pregnancy Category C. It is unknown if benzoyl peroxide is excreted in breast milk.
Topical Sulfur Applications Counseling: Topical Sulfur Counseling: Patient counseled that this medication may cause skin irritation or allergic reactions.  In the event of skin irritation, the patient was advised to reduce the amount of the drug applied or use it less frequently.   The patient verbalized understanding of the proper use and possible adverse effects of topical sulfur application.  All of the patient's questions and concerns were addressed.
Detail Level: Simple
Dapsone Counseling: I discussed with the patient the risks of dapsone including but not limited to hemolytic anemia, agranulocytosis, rashes, methemoglobinemia, kidney failure, peripheral neuropathy, headaches, GI upset, and liver toxicity.  Patients who start dapsone require monitoring including baseline LFTs and weekly CBCs for the first month, then every month thereafter.  The patient verbalized understanding of the proper use and possible adverse effects of dapsone.  All of the patient's questions and concerns were addressed.
High Dose Vitamin A Pregnancy And Lactation Text: High dose vitamin A therapy is contraindicated during pregnancy and breast feeding.
Spironolactone Counseling: Patient advised regarding risks of diarrhea, abdominal pain, hyperkalemia, birth defects (for female patients), liver toxicity and renal toxicity. The patient may need blood work to monitor liver and kidney function and potassium levels while on therapy. The patient verbalized understanding of the proper use and possible adverse effects of spironolactone.  All of the patient's questions and concerns were addressed.
Birth Control Pills Counseling: Birth Control Pill Counseling: I discussed with the patient the potential side effects of OCPs including but not limited to increased risk of stroke, heart attack, thrombophlebitis, deep venous thrombosis, hepatic adenomas, breast changes, GI upset, headaches, and depression.  The patient verbalized understanding of the proper use and possible adverse effects of OCPs. All of the patient's questions and concerns were addressed.
Isotretinoin Pregnancy And Lactation Text: This medication is Pregnancy Category X and is considered extremely dangerous during pregnancy. It is unknown if it is excreted in breast milk.
Sarecycline Counseling: Patient advised regarding possible photosensitivity and discoloration of the teeth, skin, lips, tongue and gums.  Patient instructed to avoid sunlight, if possible.  When exposed to sunlight, patients should wear protective clothing, sunglasses, and sunscreen.  The patient was instructed to call the office immediately if the following severe adverse effects occur:  hearing changes, easy bruising/bleeding, severe headache, or vision changes.  The patient verbalized understanding of the proper use and possible adverse effects of sarecycline.  All of the patient's questions and concerns were addressed.
Aklief Pregnancy And Lactation Text: It is unknown if this medication is safe to use during pregnancy.  It is unknown if this medication is excreted in breast milk.  Breastfeeding women should use the topical cream on the smallest area of the skin for the shortest time needed while breastfeeding.  Do not apply to nipple and areola.
Tazorac Counseling:  Patient advised that medication is irritating and drying.  Patient may need to apply sparingly and wash off after an hour before eventually leaving it on overnight.  The patient verbalized understanding of the proper use and possible adverse effects of tazorac.  All of the patient's questions and concerns were addressed.
Winlevi Pregnancy And Lactation Text: This medication is considered safe during pregnancy and breastfeeding.
Bactrim Counseling:  I discussed with the patient the risks of sulfa antibiotics including but not limited to GI upset, allergic reaction, drug rash, diarrhea, dizziness, photosensitivity, and yeast infections.  Rarely, more serious reactions can occur including but not limited to aplastic anemia, agranulocytosis, methemoglobinemia, blood dyscrasias, liver or kidney failure, lung infiltrates or desquamative/blistering drug rashes.
Azelaic Acid Pregnancy And Lactation Text: This medication is considered safe during pregnancy and breast feeding.
Erythromycin Pregnancy And Lactation Text: This medication is Pregnancy Category B and is considered safe during pregnancy. It is also excreted in breast milk.
Topical Clindamycin Counseling: Patient counseled that this medication may cause skin irritation or allergic reactions.  In the event of skin irritation, the patient was advised to reduce the amount of the drug applied or use it less frequently.   The patient verbalized understanding of the proper use and possible adverse effects of clindamycin.  All of the patient's questions and concerns were addressed.
Dapsone Pregnancy And Lactation Text: This medication is Pregnancy Category C and is not considered safe during pregnancy or breast feeding.
Use Enhanced Medication Counseling?: No
Doxycycline Pregnancy And Lactation Text: This medication is Pregnancy Category D and not consider safe during pregnancy. It is also excreted in breast milk but is considered safe for shorter treatment courses.
Azithromycin Counseling:  I discussed with the patient the risks of azithromycin including but not limited to GI upset, allergic reaction, drug rash, diarrhea, and yeast infections.
Birth Control Pills Pregnancy And Lactation Text: This medication should be avoided if pregnant and for the first 30 days post-partum.
High Dose Vitamin A Counseling: Side effects reviewed, pt to contact office should one occur.
Minocycline Counseling: Patient advised regarding possible photosensitivity and discoloration of the teeth, skin, lips, tongue and gums.  Patient instructed to avoid sunlight, if possible.  When exposed to sunlight, patients should wear protective clothing, sunglasses, and sunscreen.  The patient was instructed to call the office immediately if the following severe adverse effects occur:  hearing changes, easy bruising/bleeding, severe headache, or vision changes.  The patient verbalized understanding of the proper use and possible adverse effects of minocycline.  All of the patient's questions and concerns were addressed.
Topical Retinoid counseling:  Patient advised to apply a pea-sized amount only at bedtime and wait 30 minutes after washing their face before applying.  If too drying, patient may add a non-comedogenic moisturizer. The patient verbalized understanding of the proper use and possible adverse effects of retinoids.  All of the patient's questions and concerns were addressed.
Topical Sulfur Applications Pregnancy And Lactation Text: This medication is Pregnancy Category C and has an unknown safety profile during pregnancy. It is unknown if this topical medication is excreted in breast milk.
Bactrim Pregnancy And Lactation Text: This medication is Pregnancy Category D and is known to cause fetal risk.  It is also excreted in breast milk.
Topical Clindamycin Pregnancy And Lactation Text: This medication is Pregnancy Category B and is considered safe during pregnancy. It is unknown if it is excreted in breast milk.
Isotretinoin Counseling: Patient should get monthly blood tests, not donate blood, not drive at night if vision affected, not share medication, and not undergo elective surgery for 6 months after tx completed. Side effects reviewed, pt to contact office should one occur.
Spironolactone Pregnancy And Lactation Text: This medication can cause feminization of the male fetus and should be avoided during pregnancy. The active metabolite is also found in breast milk.
Benzoyl Peroxide Counseling: Patient counseled that medicine may cause skin irritation and bleach clothing.  In the event of skin irritation, the patient was advised to reduce the amount of the drug applied or use it less frequently.   The patient verbalized understanding of the proper use and possible adverse effects of benzoyl peroxide.  All of the patient's questions and concerns were addressed.
Azithromycin Pregnancy And Lactation Text: This medication is considered safe during pregnancy and is also secreted in breast milk.
Erythromycin Counseling:  I discussed with the patient the risks of erythromycin including but not limited to GI upset, allergic reaction, drug rash, diarrhea, increase in liver enzymes, and yeast infections.
Azelaic Acid Counseling: Patient counseled that medicine may cause skin irritation and to avoid applying near the eyes.  In the event of skin irritation, the patient was advised to reduce the amount of the drug applied or use it less frequently.   The patient verbalized understanding of the proper use and possible adverse effects of azelaic acid.  All of the patient's questions and concerns were addressed.
Tazorac Pregnancy And Lactation Text: This medication is not safe during pregnancy. It is unknown if this medication is excreted in breast milk.

## 2024-04-25 NOTE — PROCEDURE: TREATMENT REGIMEN
Hide Aquaphor Products: No
Action 4: Continue
Continue Regimen: Tretinoin once a day at bedtime, discontinue if she becomes pregnant
Detail Level: Simple

## 2024-04-25 NOTE — PROCEDURE: SUNSCREEN RECOMMENDATIONS

## 2024-06-03 ENCOUNTER — APPOINTMENT (OUTPATIENT)
Dept: RADIOLOGY | Facility: MEDICAL CENTER | Age: 34
End: 2024-06-03
Attending: EMERGENCY MEDICINE
Payer: COMMERCIAL

## 2024-06-03 ENCOUNTER — HOSPITAL ENCOUNTER (EMERGENCY)
Facility: MEDICAL CENTER | Age: 34
End: 2024-06-03
Attending: EMERGENCY MEDICINE
Payer: COMMERCIAL

## 2024-06-03 ENCOUNTER — APPOINTMENT (OUTPATIENT)
Dept: RADIOLOGY | Facility: MEDICAL CENTER | Age: 34
End: 2024-06-03
Payer: COMMERCIAL

## 2024-06-03 VITALS
RESPIRATION RATE: 16 BRPM | BODY MASS INDEX: 20.38 KG/M2 | DIASTOLIC BLOOD PRESSURE: 74 MMHG | OXYGEN SATURATION: 100 % | TEMPERATURE: 98.8 F | WEIGHT: 134.48 LBS | SYSTOLIC BLOOD PRESSURE: 122 MMHG | HEART RATE: 70 BPM | HEIGHT: 68 IN

## 2024-06-03 DIAGNOSIS — O20.0 THREATENED MISCARRIAGE IN EARLY PREGNANCY: ICD-10-CM

## 2024-06-03 LAB
ACTION RH IMMUNE GLOB 8505RHG: NORMAL
ALBUMIN SERPL BCP-MCNC: 4.6 G/DL (ref 3.2–4.9)
ALBUMIN/GLOB SERPL: 1.7 G/DL
ALP SERPL-CCNC: 63 U/L (ref 30–99)
ALT SERPL-CCNC: 15 U/L (ref 2–50)
ANION GAP SERPL CALC-SCNC: 9 MMOL/L (ref 7–16)
APPEARANCE UR: CLEAR
AST SERPL-CCNC: 16 U/L (ref 12–45)
B-HCG SERPL-ACNC: ABNORMAL MIU/ML (ref 0–10)
BACTERIA #/AREA URNS HPF: ABNORMAL /HPF
BASOPHILS # BLD AUTO: 0.5 % (ref 0–1.8)
BASOPHILS # BLD: 0.03 K/UL (ref 0–0.12)
BILIRUB SERPL-MCNC: 0.4 MG/DL (ref 0.1–1.5)
BILIRUB UR QL STRIP.AUTO: NEGATIVE
BUN SERPL-MCNC: 15 MG/DL (ref 8–22)
CALCIUM ALBUM COR SERPL-MCNC: 8.8 MG/DL (ref 8.5–10.5)
CALCIUM SERPL-MCNC: 9.3 MG/DL (ref 8.4–10.2)
CHLORIDE SERPL-SCNC: 103 MMOL/L (ref 96–112)
CO2 SERPL-SCNC: 24 MMOL/L (ref 20–33)
COLOR UR: YELLOW
CREAT SERPL-MCNC: 0.61 MG/DL (ref 0.5–1.4)
EOSINOPHIL # BLD AUTO: 0.08 K/UL (ref 0–0.51)
EOSINOPHIL NFR BLD: 1.4 % (ref 0–6.9)
EPI CELLS #/AREA URNS HPF: ABNORMAL /HPF
ERYTHROCYTE [DISTWIDTH] IN BLOOD BY AUTOMATED COUNT: 38 FL (ref 35.9–50)
GFR SERPLBLD CREATININE-BSD FMLA CKD-EPI: 121 ML/MIN/1.73 M 2
GLOBULIN SER CALC-MCNC: 2.7 G/DL (ref 1.9–3.5)
GLUCOSE SERPL-MCNC: 73 MG/DL (ref 65–99)
GLUCOSE UR STRIP.AUTO-MCNC: NEGATIVE MG/DL
HCT VFR BLD AUTO: 41.1 % (ref 37–47)
HGB BLD-MCNC: 14.3 G/DL (ref 12–16)
IMM GRANULOCYTES # BLD AUTO: 0.01 K/UL (ref 0–0.11)
IMM GRANULOCYTES NFR BLD AUTO: 0.2 % (ref 0–0.9)
KETONES UR STRIP.AUTO-MCNC: NEGATIVE MG/DL
LEUKOCYTE ESTERASE UR QL STRIP.AUTO: NEGATIVE
LIPASE SERPL-CCNC: 33 U/L (ref 11–82)
LYMPHOCYTES # BLD AUTO: 1.8 K/UL (ref 1–4.8)
LYMPHOCYTES NFR BLD: 31.5 % (ref 22–41)
MCH RBC QN AUTO: 31.9 PG (ref 27–33)
MCHC RBC AUTO-ENTMCNC: 34.8 G/DL (ref 32.2–35.5)
MCV RBC AUTO: 91.7 FL (ref 81.4–97.8)
MICRO URNS: ABNORMAL
MONOCYTES # BLD AUTO: 0.63 K/UL (ref 0–0.85)
MONOCYTES NFR BLD AUTO: 11 % (ref 0–13.4)
NEUTROPHILS # BLD AUTO: 3.16 K/UL (ref 1.82–7.42)
NEUTROPHILS NFR BLD: 55.4 % (ref 44–72)
NITRITE UR QL STRIP.AUTO: NEGATIVE
NRBC # BLD AUTO: 0 K/UL
NRBC BLD-RTO: 0 /100 WBC (ref 0–0.2)
NUMBER OF RH DOSES IND 8505RD: 1
PH UR STRIP.AUTO: 6 [PH] (ref 5–8)
PLATELET # BLD AUTO: 207 K/UL (ref 164–446)
PMV BLD AUTO: 8.9 FL (ref 9–12.9)
POTASSIUM SERPL-SCNC: 3.8 MMOL/L (ref 3.6–5.5)
PROT SERPL-MCNC: 7.3 G/DL (ref 6–8.2)
PROT UR QL STRIP: NEGATIVE MG/DL
RBC # BLD AUTO: 4.48 M/UL (ref 4.2–5.4)
RBC # URNS HPF: ABNORMAL /HPF
RBC UR QL AUTO: ABNORMAL
RH BLD: NORMAL
SODIUM SERPL-SCNC: 136 MMOL/L (ref 135–145)
SP GR UR STRIP.AUTO: 1.01
WBC # BLD AUTO: 5.7 K/UL (ref 4.8–10.8)
WBC #/AREA URNS HPF: ABNORMAL /HPF

## 2024-06-03 PROCEDURE — 80053 COMPREHEN METABOLIC PANEL: CPT

## 2024-06-03 PROCEDURE — 36415 COLL VENOUS BLD VENIPUNCTURE: CPT

## 2024-06-03 PROCEDURE — 86901 BLOOD TYPING SEROLOGIC RH(D): CPT

## 2024-06-03 PROCEDURE — 85025 COMPLETE CBC W/AUTO DIFF WBC: CPT

## 2024-06-03 PROCEDURE — 83690 ASSAY OF LIPASE: CPT

## 2024-06-03 PROCEDURE — 81001 URINALYSIS AUTO W/SCOPE: CPT

## 2024-06-03 PROCEDURE — 84702 CHORIONIC GONADOTROPIN TEST: CPT

## 2024-06-03 PROCEDURE — 99284 EMERGENCY DEPT VISIT MOD MDM: CPT

## 2024-06-03 PROCEDURE — 76817 TRANSVAGINAL US OBSTETRIC: CPT

## 2024-06-03 NOTE — ED PROVIDER NOTES
"ED Provider Note    CHIEF COMPLAINT  Chief Complaint   Patient presents with    Vaginal Bleeding    Pregnancy       EXTERNAL RECORDS REVIEWED  Outpatient Notes   [facility] women[facility]    HPI/ROS  LIMITATION TO HISTORY   Select: : None  OUTSIDE HISTORIAN(S):  None    Susan Garcia is a 34 y.o. female who presents here for evaluation of vaginal spotting during pregnancy.  Patient is approximately 6 weeks along, and follows up with Dr. Dodd.  She is Rh-, and was sent here for evaluation and RhoGAM.  Patient has some intermittent lower cramping, but no overt pain.  She has some vaginal spotting, but no active bleeding.  Patient is .  Takes no anticoagulants.  Has no chest pain shortness of breath or headache.    PAST MEDICAL HISTORY   has a past medical history of ASTHMA (2019) and Heart burn.    SURGICAL HISTORY   has a past surgical history that includes tonsillectomy () and orif, finger (Left, 2019).    FAMILY HISTORY  Family History   Problem Relation Age of Onset    Hypertension Father     Asthma Sister     Diabetes Paternal Grandfather        SOCIAL HISTORY  Social History     Tobacco Use    Smoking status: Never    Smokeless tobacco: Never   Vaping Use    Vaping status: Never Used   Substance and Sexual Activity    Alcohol use: Not Currently     Alcohol/week: 0.0 oz     Comment: 3 drinks week    Drug use: Not Currently    Sexual activity: Yes     Partners: Male     Birth control/protection: Pill       CURRENT MEDICATIONS  Home Medications    **Home medications have not yet been reviewed for this encounter**         ALLERGIES  Allergies   Allergen Reactions    Sulfa Drugs Unspecified     Last reaction was when she was a child. Has always stayed away from sulfa.       PHYSICAL EXAM  VITAL SIGNS: /74   Pulse 70   Temp 36.1 °C (97 °F) (Temporal)   Resp 16   Ht 1.727 m (5' 8\")   Wt 61 kg (134 lb 7.7 oz)   SpO2 98%   BMI 20.45 kg/m²    Constitutional: Well developed, " well nourished. No acute distress.  HEENT: Normocephalic, atraumatic. Posterior pharynx clear and moist.  Eyes:  EOMI. Normal sclera.  Neck: Supple, Full range of motion, nontender.  Chest/Pulmonary: clear to ausculation. Symmetrical expansion.   Cardio: Regular rate and rhythm with no murmur.   Abdomen: Soft, nontender. No peritoneal signs. No guarding.   Musculoskeletal: No deformity, no edema, neurovascular intact.   Neuro: Clear speech, appropriate, cooperative, cranial nerves II-XII grossly intact.  Psych: Normal mood and affect      EKG/LABS  Results for orders placed or performed during the hospital encounter of 06/03/24   CBC WITH DIFFERENTIAL   Result Value Ref Range    WBC 5.7 4.8 - 10.8 K/uL    RBC 4.48 4.20 - 5.40 M/uL    Hemoglobin 14.3 12.0 - 16.0 g/dL    Hematocrit 41.1 37.0 - 47.0 %    MCV 91.7 81.4 - 97.8 fL    MCH 31.9 27.0 - 33.0 pg    MCHC 34.8 32.2 - 35.5 g/dL    RDW 38.0 35.9 - 50.0 fL    Platelet Count 207 164 - 446 K/uL    MPV 8.9 (L) 9.0 - 12.9 fL    Neutrophils-Polys 55.40 44.00 - 72.00 %    Lymphocytes 31.50 22.00 - 41.00 %    Monocytes 11.00 0.00 - 13.40 %    Eosinophils 1.40 0.00 - 6.90 %    Basophils 0.50 0.00 - 1.80 %    Immature Granulocytes 0.20 0.00 - 0.90 %    Nucleated RBC 0.00 0.00 - 0.20 /100 WBC    Neutrophils (Absolute) 3.16 1.82 - 7.42 K/uL    Lymphs (Absolute) 1.80 1.00 - 4.80 K/uL    Monos (Absolute) 0.63 0.00 - 0.85 K/uL    Eos (Absolute) 0.08 0.00 - 0.51 K/uL    Baso (Absolute) 0.03 0.00 - 0.12 K/uL    Immature Granulocytes (abs) 0.01 0.00 - 0.11 K/uL    NRBC (Absolute) 0.00 K/uL   COMP METABOLIC PANEL   Result Value Ref Range    Sodium 136 135 - 145 mmol/L    Potassium 3.8 3.6 - 5.5 mmol/L    Chloride 103 96 - 112 mmol/L    Co2 24 20 - 33 mmol/L    Anion Gap 9.0 7.0 - 16.0    Glucose 73 65 - 99 mg/dL    Bun 15 8 - 22 mg/dL    Creatinine 0.61 0.50 - 1.40 mg/dL    Calcium 9.3 8.4 - 10.2 mg/dL    Correct Calcium 8.8 8.5 - 10.5 mg/dL    AST(SGOT) 16 12 - 45 U/L    ALT(SGPT)  15 2 - 50 U/L    Alkaline Phosphatase 63 30 - 99 U/L    Total Bilirubin 0.4 0.1 - 1.5 mg/dL    Albumin 4.6 3.2 - 4.9 g/dL    Total Protein 7.3 6.0 - 8.2 g/dL    Globulin 2.7 1.9 - 3.5 g/dL    A-G Ratio 1.7 g/dL   LIPASE   Result Value Ref Range    Lipase 33 11 - 82 U/L   HCG QUANTITATIVE   Result Value Ref Range    Bhcg 79774.0 (H) 0.0 - 10.0 mIU/mL   URINALYSIS,CULTURE IF INDICATED    Specimen: Urine   Result Value Ref Range    Color Yellow     Character Clear     Specific Gravity 1.010 <1.035    Ph 6.0 5.0 - 8.0    Glucose Negative Negative mg/dL    Ketones Negative Negative mg/dL    Protein Negative Negative mg/dL    Bilirubin Negative Negative    Nitrite Negative Negative    Leukocyte Esterase Negative Negative    Occult Blood Moderate (A) Negative    Micro Urine Req Microscopic    RH Type for Rhogam from E.D.   Result Value Ref Range    Emergency Department Rh Typing NEG     Number Of Rh Doses Indicated 1    ESTIMATED GFR   Result Value Ref Range    GFR (CKD-EPI) 121 >60 mL/min/1.73 m 2   ACTION: RH IMMUNE GLOBULIN   Result Value Ref Range    Action  Rh Immune Globulin [id number]       issued       1 Syrng    URINE MICROSCOPIC (W/UA)   Result Value Ref Range    WBC 2-5 /hpf    RBC 2-5 (A) /hpf    Bacteria Few (A) None /hpf    Epithelial Cells Few Few /hpf         RADIOLOGY/PROCEDURES   I have independently interpreted the diagnostic imaging associated with this visit and am waiting the final reading from the radiologist.   My preliminary interpretation is as follows: See below    Radiologist interpretation:  US-OB TRANSVAGINAL ONLY   Final Result      1.  Single living intrauterine pregnancy at 6 weeks, 0 days estimated gestational age.          COURSE & MEDICAL DECISION MAKING    ASSESSMENT, COURSE AND PLAN  Care Narrative: This is a 34-year-old female here for evaluation of vaginal spotting.  She has no acute finding on ultrasound, or blood work.  Patient did receive her RhoGAM shot, and will follow-up as  outpatient with her OB/GYN.      DISPOSITION AND DISCUSSIONS  I have discussed management of the patient with the following physicians and CORA's: None none    Discussion of management with other QHP or appropriate source(s): None    Escalation of care considered, and ultimately not performed: No IV fluids indicated    Barriers to care at this time, including but not limited to: NonePatient does not have established PCP.     Decision tools and prescription drugs considered including, but not limited to: .    FINAL DIAGNOSIS  1. Threatened miscarriage in early pregnancy           Electronically signed by: Jacek Pereira D.O., 6/3/2024 2:13 PM

## 2024-06-03 NOTE — ED NOTES
Pt ambulates to triage with c/o abdominal cramping and spotting. She states that she is approximately  weeks pregnant and recently developed cramping with spotting. She called her OB and was advised to be seen in the ED for further evaluation

## 2024-06-03 NOTE — ED NOTES
Pt cleared for d/c  dischg instructions given to pt  verbally understands  d/c 'ed to home in NAD   instructed to f/u w/ OB as soon as possible

## 2024-06-03 NOTE — Clinical Note
Susan Garcia was seen and treated in our emergency department on 6/3/2024.  She may return to work on 06/06/2024.       If you have any questions or concerns, please don't hesitate to call.      Jacek Pereira D.O.

## 2024-06-03 NOTE — ED NOTES
MD at  for re-evaluation and discussion of results  RHO D Globulin given per order    VSS  pt now cleared for d/c

## 2024-06-12 ENCOUNTER — OFFICE VISIT (OUTPATIENT)
Dept: URGENT CARE | Facility: PHYSICIAN GROUP | Age: 34
End: 2024-06-12
Payer: COMMERCIAL

## 2024-06-12 VITALS
HEART RATE: 83 BPM | TEMPERATURE: 99.1 F | RESPIRATION RATE: 14 BRPM | OXYGEN SATURATION: 100 % | DIASTOLIC BLOOD PRESSURE: 64 MMHG | WEIGHT: 133 LBS | SYSTOLIC BLOOD PRESSURE: 106 MMHG | HEIGHT: 68 IN | BODY MASS INDEX: 20.16 KG/M2

## 2024-06-12 DIAGNOSIS — J06.9 UPPER RESPIRATORY TRACT INFECTION, UNSPECIFIED TYPE: ICD-10-CM

## 2024-06-12 PROCEDURE — 3074F SYST BP LT 130 MM HG: CPT | Performed by: PHYSICIAN ASSISTANT

## 2024-06-12 PROCEDURE — 3078F DIAST BP <80 MM HG: CPT | Performed by: PHYSICIAN ASSISTANT

## 2024-06-12 PROCEDURE — 99203 OFFICE O/P NEW LOW 30 MIN: CPT | Performed by: PHYSICIAN ASSISTANT

## 2024-06-12 ASSESSMENT — ENCOUNTER SYMPTOMS
SPUTUM PRODUCTION: 0
SHORTNESS OF BREATH: 0
DIAPHORESIS: 0
MYALGIAS: 0
VOMITING: 0
WHEEZING: 0
NAUSEA: 0
HEADACHES: 1
SORE THROAT: 0
ABDOMINAL PAIN: 0
CHILLS: 0
COUGH: 1
FEVER: 0
DIZZINESS: 0
DIARRHEA: 0
PALPITATIONS: 0
SINUS PAIN: 0

## 2024-06-12 ASSESSMENT — FIBROSIS 4 INDEX: FIB4 SCORE: 0.66

## 2024-06-12 NOTE — PROGRESS NOTES
Subjective:     CHIEF COMPLAINT  Chief Complaint   Patient presents with    Cough     Productive cough, nasal congestion, facial pressure, headache x7 days. Currently 7 weeks pregnant.        HPI  Susan Garcia is a very pleasant 34 y.o. female who presents to the clinic with URI-like symptoms x 1 week.  The patient is currently 7 weeks pregnant.  States she has been experiencing congestion, cough and intermittent headaches over this time.  Does not believe she has been running a fever.  No body aches or chills.  No shortness of breath or chest pain.  No known ill contacts.  No OTC medications have been started at this time.    REVIEW OF SYSTEMS  Review of Systems   Constitutional:  Negative for chills, diaphoresis, fever and malaise/fatigue.   HENT:  Positive for congestion. Negative for ear pain, sinus pain and sore throat.    Respiratory:  Positive for cough. Negative for sputum production, shortness of breath and wheezing.    Cardiovascular:  Negative for chest pain and palpitations.   Gastrointestinal:  Negative for abdominal pain, diarrhea, nausea and vomiting.   Musculoskeletal:  Negative for myalgias.   Neurological:  Positive for headaches. Negative for dizziness.   Endo/Heme/Allergies:  Positive for environmental allergies.       PAST MEDICAL HISTORY  Patient Active Problem List    Diagnosis Date Noted    Term pregnancy 03/30/2022    Closed fracture of multiple bones of right hand 04/04/2019    Left-sided low back pain with left-sided sciatica 03/09/2016    Family planning 10/08/2015    Preventative health care 10/08/2015    Epigastric pain 10/08/2015       SURGICAL HISTORY   has a past surgical history that includes tonsillectomy (1994) and orif, finger (Left, 4/4/2019).    ALLERGIES  Allergies   Allergen Reactions    Sulfa Drugs Unspecified     Last reaction was when she was a child. Has always stayed away from sulfa.       CURRENT MEDICATIONS  Home Medications       Reviewed by Fly Leon  "KHARI (Physician Assistant) on 06/12/24 at 1139  Med List Status: <None>     Medication Last Dose Status   acetaminophen (TYLENOL) 500 MG Tab Not Taking Active   docusate sodium 100 MG Cap Not Taking Active   fluticasone (FLONASE) 50 MCG/ACT nasal spray  Active   ibuprofen (MOTRIN) 200 MG Tab Not Taking Active                    SOCIAL HISTORY  Social History     Tobacco Use    Smoking status: Never    Smokeless tobacco: Never   Vaping Use    Vaping status: Never Used   Substance and Sexual Activity    Alcohol use: Not Currently     Alcohol/week: 0.0 oz     Comment: 3 drinks week    Drug use: Not Currently    Sexual activity: Yes     Partners: Male     Birth control/protection: Pill       FAMILY HISTORY  Family History   Problem Relation Age of Onset    Hypertension Father     Asthma Sister     Diabetes Paternal Grandfather           Objective:     VITAL SIGNS: /64 (BP Location: Left arm, Patient Position: Sitting, BP Cuff Size: Small adult)   Pulse 83   Temp 37.3 °C (99.1 °F) (Temporal)   Resp 14   Ht 1.727 m (5' 8\") Comment: Pt reported  Wt 60.3 kg (133 lb)   SpO2 100%   Breastfeeding Unknown   BMI 20.22 kg/m²     PHYSICAL EXAM  Physical Exam  Constitutional:       General: She is not in acute distress.     Appearance: Normal appearance. She is not ill-appearing, toxic-appearing or diaphoretic.   HENT:      Head: Normocephalic and atraumatic.      Right Ear: Tympanic membrane, ear canal and external ear normal.      Left Ear: Tympanic membrane, ear canal and external ear normal.      Nose: Congestion and rhinorrhea present.      Mouth/Throat:      Mouth: Mucous membranes are moist.      Pharynx: No oropharyngeal exudate or posterior oropharyngeal erythema.   Eyes:      Conjunctiva/sclera: Conjunctivae normal.   Cardiovascular:      Rate and Rhythm: Normal rate and regular rhythm.      Pulses: Normal pulses.      Heart sounds: Normal heart sounds.   Pulmonary:      Effort: Pulmonary effort is " normal.      Breath sounds: Normal breath sounds. No wheezing, rhonchi or rales.   Musculoskeletal:      Cervical back: Normal range of motion. No muscular tenderness.   Lymphadenopathy:      Cervical: No cervical adenopathy.   Skin:     General: Skin is warm and dry.      Capillary Refill: Capillary refill takes less than 2 seconds.   Neurological:      Mental Status: She is alert.   Psychiatric:         Mood and Affect: Mood normal.         Thought Content: Thought content normal.         Assessment/Plan:     1. Upper respiratory tract infection, unspecified type            MDM/Comments:    Very pleasant and well-appearing 34-year-old female presenting to the clinic with URI-like symptoms x 1 week.  She is currently 7 weeks pregnant.  On exam lung sounds clear to auscultation.  No wheezes rhonchi or rales.  SpO2 100% on room air.  Patient does have mild sinus congestion with rhinorrhea present.  No signs concerning for secondary bacterial infection.  We discussed at home supportive care that the patient may try.  May use Tylenol for headache.  Nasal rinses such as nasal saline or Flonase for congestion.  May use OTC antihistamine, Robitussin and Mucinex.    Differential diagnosis, natural history, supportive care, and indications for immediate follow-up discussed. All questions answered. Patient agrees with the plan of care.    Follow-up as needed if symptoms worsen or fail to improve to PCP, Urgent care or Emergency Room.    I have personally reviewed prior external notes and test results pertinent to today's visit.  I have independently reviewed and interpreted all diagnostics ordered during this urgent care acute visit.   Discussed management options (risks,benefits, and alternatives to treatment). Pt expresses understanding and the treatment plan was agreed upon. Questions were encouraged and answered to pt's satisfaction.    Please note that this dictation was created using voice recognition software. I have  made a reasonable attempt to correct obvious errors, but I expect that there are errors of grammar and possibly content that I did not discover before finalizing the note.

## 2024-10-04 ENCOUNTER — OFFICE VISIT (OUTPATIENT)
Dept: URGENT CARE | Facility: PHYSICIAN GROUP | Age: 34
End: 2024-10-04
Payer: COMMERCIAL

## 2024-10-04 VITALS
HEIGHT: 68 IN | TEMPERATURE: 97.7 F | HEART RATE: 81 BPM | OXYGEN SATURATION: 97 % | SYSTOLIC BLOOD PRESSURE: 114 MMHG | WEIGHT: 147 LBS | DIASTOLIC BLOOD PRESSURE: 54 MMHG | BODY MASS INDEX: 22.28 KG/M2 | RESPIRATION RATE: 16 BRPM

## 2024-10-04 DIAGNOSIS — J98.8 RTI (RESPIRATORY TRACT INFECTION): ICD-10-CM

## 2024-10-04 DIAGNOSIS — Z3A.24 24 WEEKS GESTATION OF PREGNANCY: ICD-10-CM

## 2024-10-04 PROCEDURE — 3074F SYST BP LT 130 MM HG: CPT | Performed by: FAMILY MEDICINE

## 2024-10-04 PROCEDURE — 99213 OFFICE O/P EST LOW 20 MIN: CPT | Performed by: FAMILY MEDICINE

## 2024-10-04 PROCEDURE — 3078F DIAST BP <80 MM HG: CPT | Performed by: FAMILY MEDICINE

## 2024-10-04 ASSESSMENT — ENCOUNTER SYMPTOMS: COUGH: 1

## 2024-10-04 ASSESSMENT — FIBROSIS 4 INDEX: FIB4 SCORE: 0.66

## 2025-01-21 ENCOUNTER — HOSPITAL ENCOUNTER (INPATIENT)
Facility: MEDICAL CENTER | Age: 35
LOS: 2 days | End: 2025-01-23
Attending: OBSTETRICS & GYNECOLOGY | Admitting: OBSTETRICS & GYNECOLOGY
Payer: COMMERCIAL

## 2025-01-21 ENCOUNTER — ANESTHESIA (OUTPATIENT)
Dept: ANESTHESIOLOGY | Facility: MEDICAL CENTER | Age: 35
End: 2025-01-21
Payer: COMMERCIAL

## 2025-01-21 ENCOUNTER — ANESTHESIA EVENT (OUTPATIENT)
Dept: ANESTHESIOLOGY | Facility: MEDICAL CENTER | Age: 35
End: 2025-01-21
Payer: COMMERCIAL

## 2025-01-21 LAB
BASOPHILS # BLD AUTO: 0.2 % (ref 0–1.8)
BASOPHILS # BLD: 0.02 K/UL (ref 0–0.12)
EOSINOPHIL # BLD AUTO: 0.05 K/UL (ref 0–0.51)
EOSINOPHIL NFR BLD: 0.6 % (ref 0–6.9)
ERYTHROCYTE [DISTWIDTH] IN BLOOD BY AUTOMATED COUNT: 44.6 FL (ref 35.9–50)
HCT VFR BLD AUTO: 35.8 % (ref 37–47)
HGB BLD-MCNC: 12.5 G/DL (ref 12–16)
HOLDING TUBE BB 8507: NORMAL
IMM GRANULOCYTES # BLD AUTO: 0.03 K/UL (ref 0–0.11)
IMM GRANULOCYTES NFR BLD AUTO: 0.4 % (ref 0–0.9)
LYMPHOCYTES # BLD AUTO: 1.61 K/UL (ref 1–4.8)
LYMPHOCYTES NFR BLD: 19.7 % (ref 22–41)
MCH RBC QN AUTO: 33.2 PG (ref 27–33)
MCHC RBC AUTO-ENTMCNC: 34.9 G/DL (ref 32.2–35.5)
MCV RBC AUTO: 95 FL (ref 81.4–97.8)
MONOCYTES # BLD AUTO: 0.66 K/UL (ref 0–0.85)
MONOCYTES NFR BLD AUTO: 8.1 % (ref 0–13.4)
NEUTROPHILS # BLD AUTO: 5.81 K/UL (ref 1.82–7.42)
NEUTROPHILS NFR BLD: 71 % (ref 44–72)
NRBC # BLD AUTO: 0 K/UL
NRBC BLD-RTO: 0 /100 WBC (ref 0–0.2)
PLATELET # BLD AUTO: 209 K/UL (ref 164–446)
PMV BLD AUTO: 8.8 FL (ref 9–12.9)
RBC # BLD AUTO: 3.77 M/UL (ref 4.2–5.4)
T PALLIDUM AB SER QL IA: NORMAL
WBC # BLD AUTO: 8.2 K/UL (ref 4.8–10.8)

## 2025-01-21 PROCEDURE — A9270 NON-COVERED ITEM OR SERVICE: HCPCS | Performed by: OBSTETRICS & GYNECOLOGY

## 2025-01-21 PROCEDURE — 304965 HCHG RECOVERY SERVICES

## 2025-01-21 PROCEDURE — 0KQM0ZZ REPAIR PERINEUM MUSCLE, OPEN APPROACH: ICD-10-PCS | Performed by: OBSTETRICS & GYNECOLOGY

## 2025-01-21 PROCEDURE — 36415 COLL VENOUS BLD VENIPUNCTURE: CPT

## 2025-01-21 PROCEDURE — 85025 COMPLETE CBC W/AUTO DIFF WBC: CPT

## 2025-01-21 PROCEDURE — 770002 HCHG ROOM/CARE - OB PRIVATE (112)

## 2025-01-21 PROCEDURE — 700111 HCHG RX REV CODE 636 W/ 250 OVERRIDE (IP): Performed by: OBSTETRICS & GYNECOLOGY

## 2025-01-21 PROCEDURE — 700101 HCHG RX REV CODE 250: Performed by: STUDENT IN AN ORGANIZED HEALTH CARE EDUCATION/TRAINING PROGRAM

## 2025-01-21 PROCEDURE — 303615 HCHG EPIDURAL/SPINAL ANESTHESIA FOR LABOR

## 2025-01-21 PROCEDURE — 700102 HCHG RX REV CODE 250 W/ 637 OVERRIDE(OP): Performed by: OBSTETRICS & GYNECOLOGY

## 2025-01-21 PROCEDURE — 700105 HCHG RX REV CODE 258: Performed by: OBSTETRICS & GYNECOLOGY

## 2025-01-21 PROCEDURE — 10907ZC DRAINAGE OF AMNIOTIC FLUID, THERAPEUTIC FROM PRODUCTS OF CONCEPTION, VIA NATURAL OR ARTIFICIAL OPENING: ICD-10-PCS | Performed by: OBSTETRICS & GYNECOLOGY

## 2025-01-21 PROCEDURE — 700111 HCHG RX REV CODE 636 W/ 250 OVERRIDE (IP): Performed by: STUDENT IN AN ORGANIZED HEALTH CARE EDUCATION/TRAINING PROGRAM

## 2025-01-21 PROCEDURE — 700111 HCHG RX REV CODE 636 W/ 250 OVERRIDE (IP): Mod: JZ

## 2025-01-21 PROCEDURE — 86780 TREPONEMA PALLIDUM: CPT

## 2025-01-21 PROCEDURE — 59409 OBSTETRICAL CARE: CPT

## 2025-01-21 PROCEDURE — 302449 STATCHG TRIAGE ONLY (STATISTIC)

## 2025-01-21 RX ORDER — VITAMIN A ACETATE, BETA CAROTENE, ASCORBIC ACID, CHOLECALCIFEROL, .ALPHA.-TOCOPHEROL ACETATE, DL-, THIAMINE MONONITRATE, RIBOFLAVIN, NIACINAMIDE, PYRIDOXINE HYDROCHLORIDE, FOLIC ACID, CYANOCOBALAMIN, CALCIUM CARBONATE, FERROUS FUMARATE, ZINC OXIDE, CUPRIC OXIDE 3080; 12; 120; 400; 1; 1.84; 3; 20; 22; 920; 25; 200; 27; 10; 2 [IU]/1; UG/1; MG/1; [IU]/1; MG/1; MG/1; MG/1; MG/1; MG/1; [IU]/1; MG/1; MG/1; MG/1; MG/1; MG/1
1 TABLET, FILM COATED ORAL EVERY MORNING
Status: DISCONTINUED | OUTPATIENT
Start: 2025-01-22 | End: 2025-01-23 | Stop reason: HOSPADM

## 2025-01-21 RX ORDER — ACETAMINOPHEN 500 MG
1000 TABLET ORAL
Status: DISCONTINUED | OUTPATIENT
Start: 2025-01-21 | End: 2025-01-21 | Stop reason: HOSPADM

## 2025-01-21 RX ORDER — ONDANSETRON 2 MG/ML
INJECTION INTRAMUSCULAR; INTRAVENOUS
Status: COMPLETED
Start: 2025-01-21 | End: 2025-01-21

## 2025-01-21 RX ORDER — SODIUM CHLORIDE, SODIUM LACTATE, POTASSIUM CHLORIDE, CALCIUM CHLORIDE 600; 310; 30; 20 MG/100ML; MG/100ML; MG/100ML; MG/100ML
2000 INJECTION, SOLUTION INTRAVENOUS PRN
Status: DISCONTINUED | OUTPATIENT
Start: 2025-01-21 | End: 2025-01-23 | Stop reason: HOSPADM

## 2025-01-21 RX ORDER — SODIUM CHLORIDE, SODIUM LACTATE, POTASSIUM CHLORIDE, AND CALCIUM CHLORIDE .6; .31; .03; .02 G/100ML; G/100ML; G/100ML; G/100ML
250 INJECTION, SOLUTION INTRAVENOUS PRN
Status: DISCONTINUED | OUTPATIENT
Start: 2025-01-21 | End: 2025-01-21

## 2025-01-21 RX ORDER — CARBOPROST TROMETHAMINE 250 UG/ML
250 INJECTION, SOLUTION INTRAMUSCULAR
Status: DISCONTINUED | OUTPATIENT
Start: 2025-01-21 | End: 2025-01-23 | Stop reason: HOSPADM

## 2025-01-21 RX ORDER — ONDANSETRON 2 MG/ML
4 INJECTION INTRAMUSCULAR; INTRAVENOUS EVERY 4 HOURS PRN
Status: DISCONTINUED | OUTPATIENT
Start: 2025-01-21 | End: 2025-01-23 | Stop reason: HOSPADM

## 2025-01-21 RX ORDER — ROPIVACAINE HYDROCHLORIDE 2 MG/ML
INJECTION, SOLUTION EPIDURAL; INFILTRATION
Status: COMPLETED | OUTPATIENT
Start: 2025-01-21 | End: 2025-01-21

## 2025-01-21 RX ORDER — LIDOCAINE HYDROCHLORIDE AND EPINEPHRINE 15; 5 MG/ML; UG/ML
INJECTION, SOLUTION EPIDURAL
Status: COMPLETED | OUTPATIENT
Start: 2025-01-21 | End: 2025-01-21

## 2025-01-21 RX ORDER — ROPIVACAINE HYDROCHLORIDE 2 MG/ML
INJECTION, SOLUTION EPIDURAL; INFILTRATION; PERINEURAL CONTINUOUS
Status: DISCONTINUED | OUTPATIENT
Start: 2025-01-21 | End: 2025-01-21

## 2025-01-21 RX ORDER — LIDOCAINE HYDROCHLORIDE 10 MG/ML
20 INJECTION, SOLUTION INFILTRATION; PERINEURAL
Status: DISCONTINUED | OUTPATIENT
Start: 2025-01-21 | End: 2025-01-21 | Stop reason: HOSPADM

## 2025-01-21 RX ORDER — OXYTOCIN 10 [USP'U]/ML
10 INJECTION, SOLUTION INTRAMUSCULAR; INTRAVENOUS
Status: DISCONTINUED | OUTPATIENT
Start: 2025-01-21 | End: 2025-01-21 | Stop reason: HOSPADM

## 2025-01-21 RX ORDER — SODIUM CHLORIDE, SODIUM LACTATE, POTASSIUM CHLORIDE, CALCIUM CHLORIDE 600; 310; 30; 20 MG/100ML; MG/100ML; MG/100ML; MG/100ML
INJECTION, SOLUTION INTRAVENOUS CONTINUOUS
Status: DISCONTINUED | OUTPATIENT
Start: 2025-01-21 | End: 2025-01-21

## 2025-01-21 RX ORDER — METHYLERGONOVINE MALEATE 0.2 MG/ML
0.2 INJECTION INTRAVENOUS
Status: DISCONTINUED | OUTPATIENT
Start: 2025-01-21 | End: 2025-01-23 | Stop reason: HOSPADM

## 2025-01-21 RX ORDER — OXYCODONE HYDROCHLORIDE 5 MG/1
5 TABLET ORAL EVERY 4 HOURS PRN
Status: DISCONTINUED | OUTPATIENT
Start: 2025-01-21 | End: 2025-01-23 | Stop reason: HOSPADM

## 2025-01-21 RX ORDER — IBUPROFEN 800 MG/1
800 TABLET, FILM COATED ORAL EVERY 8 HOURS PRN
Status: DISCONTINUED | OUTPATIENT
Start: 2025-01-21 | End: 2025-01-23 | Stop reason: HOSPADM

## 2025-01-21 RX ORDER — TERBUTALINE SULFATE 1 MG/ML
0.25 INJECTION, SOLUTION SUBCUTANEOUS
Status: DISCONTINUED | OUTPATIENT
Start: 2025-01-21 | End: 2025-01-21 | Stop reason: HOSPADM

## 2025-01-21 RX ORDER — ACETAMINOPHEN 500 MG
1000 TABLET ORAL EVERY 6 HOURS PRN
Status: DISCONTINUED | OUTPATIENT
Start: 2025-01-21 | End: 2025-01-23 | Stop reason: HOSPADM

## 2025-01-21 RX ORDER — SODIUM CHLORIDE, SODIUM LACTATE, POTASSIUM CHLORIDE, AND CALCIUM CHLORIDE .6; .31; .03; .02 G/100ML; G/100ML; G/100ML; G/100ML
1000 INJECTION, SOLUTION INTRAVENOUS
Status: DISCONTINUED | OUTPATIENT
Start: 2025-01-21 | End: 2025-01-21

## 2025-01-21 RX ORDER — BISACODYL 10 MG
10 SUPPOSITORY, RECTAL RECTAL PRN
Status: DISCONTINUED | OUTPATIENT
Start: 2025-01-21 | End: 2025-01-23 | Stop reason: HOSPADM

## 2025-01-21 RX ORDER — DOCUSATE SODIUM 100 MG/1
100 CAPSULE, LIQUID FILLED ORAL 2 TIMES DAILY PRN
Status: DISCONTINUED | OUTPATIENT
Start: 2025-01-21 | End: 2025-01-23 | Stop reason: HOSPADM

## 2025-01-21 RX ORDER — MISOPROSTOL 200 UG/1
600 TABLET ORAL
Status: DISCONTINUED | OUTPATIENT
Start: 2025-01-21 | End: 2025-01-23 | Stop reason: HOSPADM

## 2025-01-21 RX ORDER — IBUPROFEN 800 MG/1
800 TABLET, FILM COATED ORAL
Status: COMPLETED | OUTPATIENT
Start: 2025-01-21 | End: 2025-01-21

## 2025-01-21 RX ORDER — EPHEDRINE SULFATE 50 MG/ML
5 INJECTION, SOLUTION INTRAVENOUS
Status: DISCONTINUED | OUTPATIENT
Start: 2025-01-21 | End: 2025-01-21

## 2025-01-21 RX ADMIN — ONDANSETRON 4 MG: 2 INJECTION INTRAMUSCULAR; INTRAVENOUS at 14:53

## 2025-01-21 RX ADMIN — OXYTOCIN 125 ML/HR: 10 INJECTION, SOLUTION INTRAMUSCULAR; INTRAVENOUS at 23:18

## 2025-01-21 RX ADMIN — SODIUM CHLORIDE, POTASSIUM CHLORIDE, SODIUM LACTATE AND CALCIUM CHLORIDE: 600; 310; 30; 20 INJECTION, SOLUTION INTRAVENOUS at 15:45

## 2025-01-21 RX ADMIN — LIDOCAINE HYDROCHLORIDE,EPINEPHRINE BITARTRATE 3 ML: 15; .005 INJECTION, SOLUTION EPIDURAL; INFILTRATION; INTRACAUDAL; PERINEURAL at 13:55

## 2025-01-21 RX ADMIN — IBUPROFEN 800 MG: 800 TABLET, FILM COATED ORAL at 22:41

## 2025-01-21 RX ADMIN — ROPIVACAINE HYDROCHLORIDE 10 ML: 2 INJECTION EPIDURAL; INFILTRATION; PERINEURAL at 13:55

## 2025-01-21 RX ADMIN — ROPIVACAINE HYDROCHLORIDE: 2 INJECTION, SOLUTION EPIDURAL; INFILTRATION at 15:04

## 2025-01-21 RX ADMIN — OXYTOCIN 2 MILLI-UNITS/MIN: 10 INJECTION, SOLUTION INTRAMUSCULAR; INTRAVENOUS at 19:01

## 2025-01-21 SDOH — ECONOMIC STABILITY: TRANSPORTATION INSECURITY
IN THE PAST 12 MONTHS, HAS THE LACK OF TRANSPORTATION KEPT YOU FROM MEDICAL APPOINTMENTS OR FROM GETTING MEDICATIONS?: NO

## 2025-01-21 SDOH — ECONOMIC STABILITY: TRANSPORTATION INSECURITY
IN THE PAST 12 MONTHS, HAS LACK OF RELIABLE TRANSPORTATION KEPT YOU FROM MEDICAL APPOINTMENTS, MEETINGS, WORK OR FROM GETTING THINGS NEEDED FOR DAILY LIVING?: NO

## 2025-01-21 ASSESSMENT — LIFESTYLE VARIABLES
CONSUMPTION TOTAL: NEGATIVE
HAVE PEOPLE ANNOYED YOU BY CRITICIZING YOUR DRINKING: NO
DOES PATIENT WANT TO STOP DRINKING: NO
AVERAGE NUMBER OF DAYS PER WEEK YOU HAVE A DRINK CONTAINING ALCOHOL: 0
HAVE YOU EVER FELT YOU SHOULD CUT DOWN ON YOUR DRINKING: NO
ALCOHOL_USE: NO
EVER FELT BAD OR GUILTY ABOUT YOUR DRINKING: NO
TOTAL SCORE: 0
TOTAL SCORE: 0
ON A TYPICAL DAY WHEN YOU DRINK ALCOHOL HOW MANY DRINKS DO YOU HAVE: 0
EVER HAD A DRINK FIRST THING IN THE MORNING TO STEADY YOUR NERVES TO GET RID OF A HANGOVER: NO
HOW MANY TIMES IN THE PAST YEAR HAVE YOU HAD 5 OR MORE DRINKS IN A DAY: 0
TOTAL SCORE: 0

## 2025-01-21 ASSESSMENT — PATIENT HEALTH QUESTIONNAIRE - PHQ9
2. FEELING DOWN, DEPRESSED, IRRITABLE, OR HOPELESS: NOT AT ALL
SUM OF ALL RESPONSES TO PHQ9 QUESTIONS 1 AND 2: 0
1. LITTLE INTEREST OR PLEASURE IN DOING THINGS: NOT AT ALL

## 2025-01-21 ASSESSMENT — SOCIAL DETERMINANTS OF HEALTH (SDOH)
WITHIN THE LAST YEAR, HAVE YOU BEEN HUMILIATED OR EMOTIONALLY ABUSED IN OTHER WAYS BY YOUR PARTNER OR EX-PARTNER?: NO
WITHIN THE PAST 12 MONTHS, YOU WORRIED THAT YOUR FOOD WOULD RUN OUT BEFORE YOU GOT THE MONEY TO BUY MORE: NEVER TRUE
IN THE PAST 12 MONTHS, HAS THE ELECTRIC, GAS, OIL, OR WATER COMPANY THREATENED TO SHUT OFF SERVICE IN YOUR HOME?: NO
WITHIN THE LAST YEAR, HAVE TO BEEN RAPED OR FORCED TO HAVE ANY KIND OF SEXUAL ACTIVITY BY YOUR PARTNER OR EX-PARTNER?: NO
WITHIN THE LAST YEAR, HAVE YOU BEEN KICKED, HIT, SLAPPED, OR OTHERWISE PHYSICALLY HURT BY YOUR PARTNER OR EX-PARTNER?: NO
WITHIN THE LAST YEAR, HAVE YOU BEEN AFRAID OF YOUR PARTNER OR EX-PARTNER?: NO
WITHIN THE PAST 12 MONTHS, THE FOOD YOU BOUGHT JUST DIDN'T LAST AND YOU DIDN'T HAVE MONEY TO GET MORE: NEVER TRUE

## 2025-01-21 ASSESSMENT — FIBROSIS 4 INDEX: FIB4 SCORE: 0.66

## 2025-01-21 ASSESSMENT — PAIN DESCRIPTION - PAIN TYPE
TYPE: ACUTE PAIN
TYPE: ACUTE PAIN

## 2025-01-21 NOTE — ANESTHESIA PROCEDURE NOTES
Epidural Block    Date/Time: 1/21/2025 1:55 PM    Performed by: Albert Ramon M.D.  Authorized by: Albert Ramon M.D.    Patient Location:  OB  Start Time:  1/21/2025 1:55 PM  End Time:  1/21/2025 2:10 PM  Reason for Block: labor analgesia    patient identified, IV checked, site marked, risks and benefits discussed, surgical consent, monitors and equipment checked, pre-op evaluation and timeout performed    Patient Position:  Sitting  Prep: ChloraPrep, patient draped and sterile technique    Monitoring:  Blood pressure, continuous pulse oximetry and heart rate  Approach:  Midline  Location:  L4-L5  Injection Technique:  YISSEL air  Skin infiltration:  Lidocaine  Strength:  1%  Dose:  3ml  Needle Type:  Tuohy  Needle Gauge:  17 G  Needle Length:  3.5 in  Loss of resistance::  6  Catheter Size:  19 G  Catheter at Skin Depth:  11  Test Dose Result:  Negative   Epidural catheter placed and positive aspiration for blood. Catheter was then removed for likely intravascular placement. Epidural then placed at different level. Aspiration negative and test dose negative. Bolus of ropivacaine given afterwards with stable vital signs.

## 2025-01-21 NOTE — PROGRESS NOTES
OB Progress Note    Comfortable with her epidural.  FHT with baseline of 130s, moderate variability present, accelerations present, decelerations absent.  Tocometer not demonstrating contractions in its current position.  SVE 8/80/-1.  We discussed the risks and benefits of AROM.  She stated understanding and desired to proceed.  Performed with clear fluid.  Anticipate vaginal delivery.

## 2025-01-21 NOTE — ANESTHESIA PREPROCEDURE EVALUATION
Date: 01/21/25  Procedure: Labor Epidural         Relevant Problems   No relevant active problems       Physical Exam    Airway   Mallampati: II  TM distance: >3 FB  Neck ROM: full       Cardiovascular - normal exam  Rhythm: regular  Rate: normal  (-) murmur     Dental - normal exam           Pulmonary - normal exam  Breath sounds clear to auscultation     Abdominal    Neurological - normal exam                   Anesthesia Plan    ASA 2       Plan - epidural   Neuraxial block will be labor analgesia            Induction: intravenous    Postoperative Plan: Postoperative administration of opioids is intended.    Pertinent diagnostic labs and testing reviewed    Informed Consent:    Anesthetic plan and risks discussed with patient.    Use of blood products discussed with: whom consented to blood products.

## 2025-01-21 NOTE — H&P
"Labor and History and Physical    CC: Contractions    HPI: Justina Garcia is a 34-year-old G2, P1 who presented to clinic today 38 weeks and 6 days for her routine prenatal care.  She was experiencing some lower abdominal pressure and cramping.  She was checked in office and found to be 7 cm.  She was sent to OB triage for evaluation.  Upon presenting she was felt to be 6 to 7 cm.  After an hour of ambulation and rechecking she made cervical changes 7-8 cm.    She received her prenatal care with Dr. Dodd this pregnancy. She had NIPT demonstrating a chromosome structure 46 XY.  She had a normal anatomic scan completed by 20 weeks and 5 days.    ROS: All other systems were reviewed and were found to be negative    PMH: Asthma  PSH: Tonsillectomy, repair of sugars of her middle finger in her left hand and subsequent screw removal from the middle finger of her left hand  OB Hx: : Term  of a viable female infant weighing 6 pounds 15 ounces  GYN Hx: She denies history of sexually transmitted infection including herpes simplex virus for herself or her spouse.  She denies abnormal Pap smears.  Her last Pap was NILM in .  FH: Elevated cholesterol, arthritis, diabetes  SH: Denies tobacco, alcohol, or illicit drug use.  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  Medications: Prenatal vitamins  Allergies: Sulfa antibiotics cause hives    /79   Pulse 81   Temp 37 °C (98.6 °F)   Resp 16   Ht 1.727 m (5' 8\")   Wt 72.6 kg (160 lb)   BMI 24.33 kg/m²     General: No apparent distress  Abdomen: Gravid, nontender  Extremities: No edema    FHT: Baseline of 140s, moderate variability present, accelerations present, decelerations absent  Tocometer: Contractions every 3 min    Pertinent lab results  ABO O- status post RhoGAM 2024  GBS negative  GTT 99  HIV negative  Hep B negative  Hep C negative  RPR negative  Rubella immune    Assessment:  Term IUP at 38 weeks 6 days  Labor  Maternal Rh- status    Plan:  Admit to labor " and delivery for management of labor.  She may have an epidural on demand.  Anticipate vaginal delivery.  Please assess for need of RhoGAM postpartum.    Sandip Jerry M.D.

## 2025-01-22 LAB
ERYTHROCYTE [DISTWIDTH] IN BLOOD BY AUTOMATED COUNT: 43.6 FL (ref 35.9–50)
HCT VFR BLD AUTO: 32.7 % (ref 37–47)
HGB BLD-MCNC: 11.4 G/DL (ref 12–16)
MCH RBC QN AUTO: 32.4 PG (ref 27–33)
MCHC RBC AUTO-ENTMCNC: 34.9 G/DL (ref 32.2–35.5)
MCV RBC AUTO: 92.9 FL (ref 81.4–97.8)
NUMBER OF RH DOSES IND 8505RD: NORMAL
PLATELET # BLD AUTO: 188 K/UL (ref 164–446)
PMV BLD AUTO: 8.7 FL (ref 9–12.9)
RBC # BLD AUTO: 3.52 M/UL (ref 4.2–5.4)
WBC # BLD AUTO: 11.6 K/UL (ref 4.8–10.8)

## 2025-01-22 PROCEDURE — A9270 NON-COVERED ITEM OR SERVICE: HCPCS | Performed by: OBSTETRICS & GYNECOLOGY

## 2025-01-22 PROCEDURE — 85027 COMPLETE CBC AUTOMATED: CPT

## 2025-01-22 PROCEDURE — 700102 HCHG RX REV CODE 250 W/ 637 OVERRIDE(OP): Performed by: OBSTETRICS & GYNECOLOGY

## 2025-01-22 PROCEDURE — 770002 HCHG ROOM/CARE - OB PRIVATE (112)

## 2025-01-22 PROCEDURE — 36415 COLL VENOUS BLD VENIPUNCTURE: CPT

## 2025-01-22 RX ORDER — IBUPROFEN 800 MG/1
800 TABLET, FILM COATED ORAL EVERY 8 HOURS PRN
Qty: 30 TABLET | Refills: 0 | Status: SHIPPED | OUTPATIENT
Start: 2025-01-22

## 2025-01-22 RX ADMIN — DOCUSATE SODIUM 100 MG: 100 CAPSULE, LIQUID FILLED ORAL at 08:37

## 2025-01-22 RX ADMIN — ACETAMINOPHEN 1000 MG: 500 TABLET ORAL at 17:52

## 2025-01-22 RX ADMIN — ACETAMINOPHEN 1000 MG: 500 TABLET ORAL at 04:24

## 2025-01-22 RX ADMIN — IBUPROFEN 800 MG: 800 TABLET, FILM COATED ORAL at 23:41

## 2025-01-22 RX ADMIN — ACETAMINOPHEN 1000 MG: 500 TABLET ORAL at 08:37

## 2025-01-22 RX ADMIN — PRENATAL WITH FERROUS FUM AND FOLIC ACID 1 TABLET: 3080; 920; 120; 400; 22; 1.84; 3; 20; 10; 1; 12; 200; 27; 25; 2 TABLET ORAL at 08:24

## 2025-01-22 RX ADMIN — ACETAMINOPHEN 1000 MG: 500 TABLET ORAL at 23:40

## 2025-01-22 RX ADMIN — IBUPROFEN 800 MG: 800 TABLET, FILM COATED ORAL at 11:04

## 2025-01-22 ASSESSMENT — PAIN DESCRIPTION - PAIN TYPE
TYPE: ACUTE PAIN

## 2025-01-22 NOTE — PROGRESS NOTES
0745 - Assessment completed. Fundus firm, lochia light. Plan of care reviewed with MOB, verbalized understanding. Denies pain at this time, will call if pain medication needed.

## 2025-01-22 NOTE — ANESTHESIA POSTPROCEDURE EVALUATION
Patient: Susan Garcia    Procedure Summary       Date: 01/21/25 Room / Location:     Anesthesia Start: 1355 Anesthesia Stop: 1954    Procedure: Labor Epidural Diagnosis:     Scheduled Providers:  Responsible Provider: Albert Ramon M.D.    Anesthesia Type: general ASA Status: 2            Final Anesthesia Type: general  Last vitals  BP   Blood Pressure: 123/73    Temp   36.4 °C (97.6 °F)    Pulse   (!) 101   Resp   16    SpO2          Anesthesia Post Evaluation    Patient location during evaluation: bedside  Patient participation: complete - patient participated  Level of consciousness: awake and alert    Airway patency: patent  Anesthetic complications: no  Cardiovascular status: hemodynamically stable  Respiratory status: acceptable  Hydration status: euvolemic    PONV: none          No notable events documented.     Nurse Pain Score: 2 (NPRS)

## 2025-01-22 NOTE — PROGRESS NOTES
-- Discharged instructions provided to patient and verbalized understanding. No further questions at this time.     1345-- bands verified Infant placed in car seat by mom Evaluated baby in car seat and is ready for discharge. Mom and baby escorted by staff to vehicle

## 2025-01-22 NOTE — ANESTHESIA TIME REPORT
Anesthesia Start and Stop Event Times       Date Time Event    1/21/2025 1349 Ready for Procedure     1355 Anesthesia Start     1954 Anesthesia Stop          Responsible Staff  01/21/25      Name Role Begin Dany Price Ma, M.D. Anesth 1355 1954          Overtime Reason:  no overtime (within assigned shift)    Comments:

## 2025-01-22 NOTE — PROGRESS NOTES
1900 - Received report from April RN.     1945 - Dr. Jerry notified that patient is complete. Orders received to start pushing.     1947 - Dr. Jerry at bedside for pushing efforts.

## 2025-01-22 NOTE — L&D DELIVERY NOTE
"Delivery Summary    Ekaterina Garcia is a 33 yo  who presented at 38w6d with spontaneous labor. She received an epidural for anesthesia. She underwent artificial rupture of membranes. She was started on pitocin for augmentation. She progressed to complete dilation where with pushing efforts she underwent a spontaneous vaginal delivery of a viable male infant \"Eliceo\" in MIRELA position with APGARS 8/9. The head delivered atraumatically. The anterior shoulder delivered with gentle downward pressure and the remainder of the body followed. The infant was placed on the maternal chest. The cord was clamped and cut after a one minute delay. The placenta was delivered with gentle traction. The uterus firmed with fundal pressure and pitocin. A vulvovaginal examination was performed and a firstdegree laceration was repaired with 3.0 chromic in the usual fashion.  Sponge and needle counts were correct x2.    EBL: 400cc  Anesthesia: epidural  Complications: None    Sandip Jerry M.D.    "

## 2025-01-22 NOTE — CARE PLAN
The patient is Stable - Low risk of patient condition declining or worsening    Shift Goals  Clinical Goals: vss, lochia WNL, adequate pain, bond  Patient Goals: breastfeed Q2-3 hours  Family Goals: rest, bonding      Problem: Psychosocial - Postpartum  Goal: Patient will verbalize and demonstrate effective bonding and parenting behavior  Outcome: Progressing  Note: MOB has shown adequate bonding with infant, provided skin to skin, and proper cares of infant.      Problem: Knowledge Deficit - Postpartum  Goal: Patient will verbalize and demonstrate understanding of self and infant care  Outcome: Progressing  Note: MOB demonstrates and verbalizes understanding on how to care for . Asks appropriate questions and calls for help when necessary. Education has been provided to patient.

## 2025-01-22 NOTE — DISCHARGE SUMMARY
"Discharge Summary    Admission Date: 2025    Discharge Date: 2025    Diagnosis: S/P     Subjective: Pain controlled. Normal lochia. Eating, voiding and ambulating without difficulty.    /58   Pulse 75   Temp 36.6 °C (97.8 °F) (Temporal)   Resp 18   Ht 1.727 m (5' 8\")   Wt 72.6 kg (160 lb)   SpO2 98%   Breastfeeding Yes   BMI 24.33 kg/m²     GEN: NAD  GI:soft, NT, ND  :fundus firm and below umbilicus  EXT:no edema    Hospital Course: Patient is a 34-year-old G2, P1 who presented at 38 weeks and 6 days in labor.  She is status post  of a viable male infant with Apgars of 8 and 9 on 2025.   cc.  Second-degree laceration repair.  She was meeting all postpartum goals and was stable for discharge home on postpartum day 1.    Discharge Instructions   1. Diet : general  2. Activity: Pelvic rest for 6 weeks    Current Outpatient Medications   Medication Sig Dispense Refill    ibuprofen (MOTRIN) 800 MG Tab Take 1 Tablet by mouth every 8 hours as needed for Moderate Pain. 30 Tablet 0         Follow up: 6 weeks    Complications:none    Sandip Jerry M.D.    "

## 2025-01-22 NOTE — PROGRESS NOTES
2305 Infant received to Room # 343 from L&D in MOB's arms, placed into open crib, ID bands checked x2, cuddles tag in place and blinking. Bedside report received from Tressa JOHN L&D RN. Parents oriented to room, unit, POC, call light, feeding schedule, diapering, and infant safety and security; questions answered and parents verbalize understanding.  Pt assessment completed. No abnormal findings noted. All pt needs and questions addressed at this time.

## 2025-01-22 NOTE — CARE PLAN
The patient is Watcher - Medium risk of patient condition declining or worsening         Progress made toward(s) clinical / shift goals:  EFM & TOCO in place, epidural for pain management      Patient is not progressing towards the following goals:

## 2025-01-22 NOTE — LACTATION NOTE
This note was copied from a baby's chart.  Initial Visit:    38w6d infant delivered vaginally to a  mother 26 at 0754  Recently documented latch score 8    Feeding Plan: breastfeeding    Breastfeeding History: Susan pumped and provided for her first child for 4 months, she reports she was never able to achieve a latch with her first baby.     Medical History: No significant breast feeding risk factors noted     Rajeev is attempting to latch baby upon LC entering the room. She reports that she is unsure if she is latching baby correctly. LC offered assistance and she agrees.     Bilateral breast assessment WNL, nipples intact, no damage noted. Left nipple pseudoinverted.     Baby placed skin to skin in cross cradle position on the right side. Mom placed baby into proper positioning, breast wedging and LC provided minimal assistance with asymmetrical latch technique. When presented the nipple, baby opened mouth with a wide gape and latched deeply to the breast. Organized and nutritive suck pattern noted, and pointed out to mom. Audible swallows noted. He fed for about 10 minutes without discomfort for mom.     Breast feeding education provided: Education provided regarding the milk making process and supply in demand. Frequent skin to skin encouraged. Encouraged MOB to offer the breast to baby any time he is showing hunger cues (cues reviewed). Encouraged MOB to allow baby to self limit at the breast. Anticipatory guidance provided regarding typical  feeding behaviors in the first 24-48hrs, including cluster feeding. Proper positioning and latch technique verbalized. Education provided regarding the importance of achieving a deep latch with each feeding to ensure proper stimulation, milk transfer, and reduce the chance for nipple damage/pain.     Plan: Continue offering the breast to baby on cue, a minimum of 8 times every 24hrs. Skin to skin for each feeding. Hand expression and feed back colostrum  (with RN assistance) if baby due to feed, but too sleepy or unable to latch. Do not limit baby's time at the breast. Reach out to RN/LC if experiencing pain with latch or if unable to latch baby independently.

## 2025-01-23 VITALS
HEART RATE: 95 BPM | WEIGHT: 160 LBS | DIASTOLIC BLOOD PRESSURE: 67 MMHG | TEMPERATURE: 98.7 F | BODY MASS INDEX: 24.25 KG/M2 | HEIGHT: 68 IN | RESPIRATION RATE: 18 BRPM | SYSTOLIC BLOOD PRESSURE: 120 MMHG | OXYGEN SATURATION: 97 %

## 2025-01-23 PROCEDURE — A9270 NON-COVERED ITEM OR SERVICE: HCPCS | Performed by: OBSTETRICS & GYNECOLOGY

## 2025-01-23 PROCEDURE — 700102 HCHG RX REV CODE 250 W/ 637 OVERRIDE(OP): Performed by: OBSTETRICS & GYNECOLOGY

## 2025-01-23 RX ADMIN — IBUPROFEN 800 MG: 800 TABLET, FILM COATED ORAL at 07:58

## 2025-01-23 RX ADMIN — ACETAMINOPHEN 1000 MG: 500 TABLET ORAL at 05:32

## 2025-01-23 RX ADMIN — DOCUSATE SODIUM 100 MG: 100 CAPSULE, LIQUID FILLED ORAL at 07:58

## 2025-01-23 RX ADMIN — PRENATAL WITH FERROUS FUM AND FOLIC ACID 1 TABLET: 3080; 920; 120; 400; 22; 1.84; 3; 20; 10; 1; 12; 200; 27; 25; 2 TABLET ORAL at 07:58

## 2025-01-23 ASSESSMENT — EDINBURGH POSTNATAL DEPRESSION SCALE (EPDS)
I HAVE BEEN SO UNHAPPY THAT I HAVE HAD DIFFICULTY SLEEPING: NOT AT ALL
I HAVE LOOKED FORWARD WITH ENJOYMENT TO THINGS: AS MUCH AS I EVER DID
I HAVE BEEN ANXIOUS OR WORRIED FOR NO GOOD REASON: NO, NOT AT ALL
THE THOUGHT OF HARMING MYSELF HAS OCCURRED TO ME: NEVER
I HAVE BEEN ABLE TO LAUGH AND SEE THE FUNNY SIDE OF THINGS: AS MUCH AS I ALWAYS COULD
I HAVE FELT SCARED OR PANICKY FOR NO GOOD REASON: NO, NOT AT ALL
I HAVE BEEN SO UNHAPPY THAT I HAVE BEEN CRYING: NO, NEVER
THINGS HAVE BEEN GETTING ON TOP OF ME: NO, MOST OF THE TIME I HAVE COPED QUITE WELL
I HAVE FELT SAD OR MISERABLE: NO, NOT AT ALL
I HAVE BLAMED MYSELF UNNECESSARILY WHEN THINGS WENT WRONG: YES, MOST OF THE TIME

## 2025-01-23 ASSESSMENT — PAIN DESCRIPTION - PAIN TYPE
TYPE: ACUTE PAIN
TYPE: ACUTE PAIN

## 2025-01-23 NOTE — PROGRESS NOTES
1905 Received report from DERRICK Wills  2005 Pt assessment completed. No abnormal findings noted. POB informed of need to complete 24 hour NB testing. POB voiced understanding and agreement. All pt needs and questions addressed at this time.

## 2025-01-23 NOTE — DISCHARGE SUMMARY
Discharge Summary    Admission Date: 2025    Discharge Date: 2025    Diagnosis: S/P     Subjective: Pain controlled. Normal lochia. Eating, voiding and ambulating without difficulty.She ended up staying another day to baby needing to stay but now ready for discharge home today.     VSS afebrile    GEN: NAD  GI:soft, NT, ND  :fundus firm and below umbilicus  EXT:no edema    Hospital Course: Patient is a 34-year-old G2, P1 who presented at 38 weeks and 6 days in labor.  She is status post  of a viable male infant with Apgars of 8 and 9 on 2025.   cc.  Second-degree laceration repair.  She was meeting all postpartum goals and was stable for discharge home on postpartum day 2.    Discharge Instructions   1. Diet : general  2. Activity: Pelvic rest for 6 weeks  3. Continue prenatal vitamins daily  4. Encourage breastfeeding  5. Rx: Motrin was written by Dr Barton yesterday.     Current Outpatient Medications   Medication Sig Dispense Refill    ibuprofen (MOTRIN) 800 MG Tab Take 1 Tablet by mouth every 8 hours as needed for Moderate Pain. 30 Tablet 0         Follow up: 6 weeks [place]    Complications:none    Alisa Larsen M.D.

## 2025-01-23 NOTE — PROGRESS NOTES
-- Discharged instructions provided to patient and verbalized understanding. No further questions at this time.     1250-- bands verified Infant placed in car seat by mom Evaluated baby in car seat and is ready for discharge. Mom and baby escorted by staff to vehicle

## 2025-01-23 NOTE — DISCHARGE INSTRUCTIONS
PATIENT DISCHARGE EDUCATION INSTRUCTION SHEET    REASONS TO CALL YOUR OBSTETRICIAN  Persistent fever, shaking, chills (Temperature higher than 100.4) may indicate you have an infection  Heavy bleeding: soaking more than 1 pad per hour; Passing clots an egg-sized clot or bigger may mean you have an postpartum hemorrhage  Foul odor from vagina or bad smelling or discolored discharge or blood  Breast infection (Mastitis symptoms); breast pain, chills, fever, redness or red streaks, may feel flu like symptoms  Urinary pain, burning or frequency  Incision that is not healing, increased redness, swelling, tenderness or pain, or any pus from episiotomy or  site may mean you have an infection  Redness, swelling, warmth, or painful to touch in the calf area of your leg may mean you have a blood clot  Severe or intensified depression, thoughts or feelings of wanting to hurt yourself or someone else   Pain in chest, obstructed breathing or shortness of breath (trouble catching your breath) may mean you are having a postpartum complication. Call your provider immediately   Headache that does not get better, even after taking medicine, a bad headache with vision changes or pain in the upper right area of your belly may mean you have high blood pressure or post birth preeclampsia. Call your provider immediately    HAND WASHING  All family and friends should wash their hands:  Before and after holding the baby  Before feeding the baby  After using the restroom or changing the baby's diaper    WOUND CARE  Ask your physician for additional care instructions. In general:  Episiotomy/Laceration  May use sasha-spray bottle, witch hazel pads and dermaplast spray for comfort  Use sasha-spray bottle after urinating to cleanse perineal area  To prevent burning during urination spray sasha-water bottle on labial area   Pat perineal area dry until episiotomy/laceration is healed  Continue to use sasha-bottle until bleeding stops as  needed  If have a 2nd degree laceration or greater, a Sitz bath can offer relief from soreness, burning, and inflammation   Sitz Bath   Sit in 6 inches of warm water and soak laceration as needed until the laceration heals    VAGINAL CARE AND BLEEDING  Nothing inside vagina for 6 weeks:   No sexual intercourse, tampons or douching  Bleeding may continue for 2-4 weeks. Amount and color may vary  Soaking 1 pad or more in an hour for several hours is considered heavy bleeding  Passing large egg sized blood clots can be concerning  If you feel like you have heavy bleeding or are having increasing amount of blood clots call your Obstetrician immediately  If you begin feeling faint upon standing, feeling sick to your stomach, have clammy skin, a really fast heartbeat, have chills, start feeling confused, dizzy, sleepy or weak, or feeling like you're going to faint call your Obstetrician immediately    HYPERTENSION   Preeclampsia or gestational hypertension are types of high blood pressure that only pregnant women can get. It is important for you to be aware of symptoms to seek early intervention and treatment. If you have any of these symptoms immediately call your Obstetrician    Vision changes or blurred vision   Severe headache or pain that is unrelieved with medication and will not go away  Persistent pain in upper abdomen or shoulder   Increased swelling of face, feet, or hands  Difficulty breathing or shortness of breath at rest  Urinating less than usual    URINATION AND BOWEL MOVEMENTS  Eating more fiber (bran cereal, fruits, and vegetables) and drinking plenty of fluids will help to avoid constipation  Urinary frequency and urgency after childbirth is normal  If you experience any urinary pain, burning or frequency call your provider    BIRTH CONTROL  It is possible to become pregnant at any time after delivery and while breastfeeding  Plan to discuss a method of birth control with your physician at your post  "delivery follow up visit    POSTPARTUM BLUES  During the first few days after birth, you may experience a sense of the \"blues\" which may include impatience, irritability or even crying. These feelings come and go quickly. However, as many as 1 in 10 women experience emotional symptoms known as postpartum depression.     POSTPARTUM DEPRESSION    May start as early as the second or third day after delivery or take several weeks or months to develop. Symptoms of \"blues\" are present, but are more intense: Crying spells; loss of appetite; feelings of hopelessness or loss of control; fear of touching the baby; over concern or no concern at all about the baby; little or no concern about your own appearance/caring for yourself; and/or inability to sleep or excessive sleeping. Contact your Obstetrician if you are experiencing any of these symptoms     PREVENTING SHAKEN BABY  If you are angry or stressed, PUT THE BABY IN THE CRIB, step away, take some deep breaths, and wait until you are calm to care for the baby. DO NOT SHAKE THE BABY. You are not alone, call a supporter for help.  [facility] 24/7 crisis call line (028-035-1589) or (1-460.155.1230)  You can also text them, text \"ANSWER\" (797943)  "

## 2025-01-23 NOTE — PROGRESS NOTES
Assessment completed. Fundus firm, lochia scant.  Plan of care reviewed with MOB, verbalized understanding.

## 2025-01-23 NOTE — CARE PLAN
Problem: Infection - Postpartum  Goal: Postpartum patient will be free of signs and symptoms of infection  Outcome: Progressing   The patient is Stable - Low risk of patient condition declining or worsening    Shift Goals  Clinical Goals: firm fundus, light lochia  Patient Goals: rest  Family Goals: bonding    Progress made toward(s) clinical / shift goals:  Pt vitals are within defined parameters. No signs/symptoms related to infection at this time.

## 2025-01-23 NOTE — LACTATION NOTE
Followup visit  FRANCISCO reports her nipples are beginning to feel sore after baby cluster fed last night.   Nipples are intact. Inverted at tips bilaterally.  MOB reports she did not breastfeed her first child. She plans to see [facility] for private lactation services post d/c.  Baby in nursery for circumcision at this time. Reviewed normal  feeding frequency of first 5 days and 6-8 weeks, discussed cluster feeding norms. Discussed stool changes expected by day 5 and given Birth and beyond keerthi info.  Encouraged skin to skin when baby returns to room, and call when hunger cues observed.   F/U @1100: FRANCISCO reports baby  aobut 9:30 am. Discussed laid back option for feedings. She reports she has stored colostrum at home, discussed how to feed it to baby. Discussed signs of dehydration and jaundice.Reviewed stool changes expected by day 5.

## 2025-04-25 ENCOUNTER — APPOINTMENT (OUTPATIENT)
Dept: URBAN - METROPOLITAN AREA CLINIC 22 | Facility: CLINIC | Age: 35
Setting detail: DERMATOLOGY
End: 2025-04-25

## 2025-04-25 DIAGNOSIS — Z71.89 OTHER SPECIFIED COUNSELING: ICD-10-CM

## 2025-04-25 DIAGNOSIS — D22 MELANOCYTIC NEVI: ICD-10-CM

## 2025-04-25 DIAGNOSIS — L81.4 OTHER MELANIN HYPERPIGMENTATION: ICD-10-CM

## 2025-04-25 PROBLEM — D22.5 MELANOCYTIC NEVI OF TRUNK: Status: ACTIVE | Noted: 2025-04-25

## 2025-04-25 PROBLEM — D22.61 MELANOCYTIC NEVI OF RIGHT UPPER LIMB, INCLUDING SHOULDER: Status: ACTIVE | Noted: 2025-04-25

## 2025-04-25 PROBLEM — D23.71 OTHER BENIGN NEOPLASM OF SKIN OF RIGHT LOWER LIMB, INCLUDING HIP: Status: ACTIVE | Noted: 2025-04-25

## 2025-04-25 PROBLEM — D22.62 MELANOCYTIC NEVI OF LEFT UPPER LIMB, INCLUDING SHOULDER: Status: ACTIVE | Noted: 2025-04-25

## 2025-04-25 PROBLEM — D23.72 OTHER BENIGN NEOPLASM OF SKIN OF LEFT LOWER LIMB, INCLUDING HIP: Status: ACTIVE | Noted: 2025-04-25

## 2025-04-25 PROCEDURE — ? COUNSELING

## 2025-04-25 PROCEDURE — ? SUNSCREEN RECOMMENDATIONS

## 2025-04-25 PROCEDURE — 99213 OFFICE O/P EST LOW 20 MIN: CPT

## 2025-04-25 ASSESSMENT — LOCATION SIMPLE DESCRIPTION DERM
LOCATION SIMPLE: INFERIOR FOREHEAD
LOCATION SIMPLE: UPPER BACK
LOCATION SIMPLE: CHEST
LOCATION SIMPLE: LEFT FOREARM
LOCATION SIMPLE: LEFT UPPER ARM
LOCATION SIMPLE: RIGHT FOREARM
LOCATION SIMPLE: RIGHT UPPER ARM

## 2025-04-25 ASSESSMENT — LOCATION DETAILED DESCRIPTION DERM
LOCATION DETAILED: INFERIOR MID FOREHEAD
LOCATION DETAILED: UPPER STERNUM
LOCATION DETAILED: SUPERIOR THORACIC SPINE
LOCATION DETAILED: LEFT VENTRAL PROXIMAL FOREARM
LOCATION DETAILED: RIGHT VENTRAL PROXIMAL FOREARM
LOCATION DETAILED: LEFT ANTERIOR PROXIMAL UPPER ARM
LOCATION DETAILED: RIGHT ANTERIOR PROXIMAL UPPER ARM

## 2025-04-25 ASSESSMENT — LOCATION ZONE DERM
LOCATION ZONE: FACE
LOCATION ZONE: TRUNK
LOCATION ZONE: ARM

## 2025-05-06 ENCOUNTER — OFFICE VISIT (OUTPATIENT)
Dept: URGENT CARE | Facility: PHYSICIAN GROUP | Age: 35
End: 2025-05-06
Payer: COMMERCIAL

## 2025-05-06 VITALS
DIASTOLIC BLOOD PRESSURE: 64 MMHG | OXYGEN SATURATION: 98 % | HEART RATE: 68 BPM | HEIGHT: 68 IN | BODY MASS INDEX: 20 KG/M2 | TEMPERATURE: 98.6 F | RESPIRATION RATE: 16 BRPM | WEIGHT: 132 LBS | SYSTOLIC BLOOD PRESSURE: 104 MMHG

## 2025-05-06 DIAGNOSIS — N64.4 BREAST PAIN: ICD-10-CM

## 2025-05-06 PROCEDURE — 99213 OFFICE O/P EST LOW 20 MIN: CPT

## 2025-05-06 PROCEDURE — 3074F SYST BP LT 130 MM HG: CPT

## 2025-05-06 PROCEDURE — 3078F DIAST BP <80 MM HG: CPT

## 2025-05-06 RX ORDER — CEPHALEXIN 500 MG/1
500 CAPSULE ORAL 3 TIMES DAILY
Qty: 15 CAPSULE | Refills: 0 | Status: SHIPPED | OUTPATIENT
Start: 2025-05-06 | End: 2025-05-11

## 2025-05-06 ASSESSMENT — ENCOUNTER SYMPTOMS
HEADACHES: 1
CHILLS: 0
FEVER: 0

## 2025-05-06 ASSESSMENT — FIBROSIS 4 INDEX: FIB4 SCORE: 0.75

## 2025-05-06 NOTE — PROGRESS NOTES
Verbal consent was acquired by the patient to use [employer] ambient listening note generation during this visit   Subjective:   Susan Garcia is a 35 y.o. female who presents for Breast Pain (Pt states she's breat feeding and noticed pain and a little bit of swelling on (R) breast x 4 days)      HPI:  History of Present Illness  The patient is a 35-year-old female who presents for evaluation of right breast pain.    She began experiencing discomfort in her right breast 4 days ago, initially attributing it to a potential clogged milk duct due to nursing. The discomfort has progressively intensified, with the majority of the pain localized under her armpit and extending to her shoulder. She reports no presence of red streaks or redness on her breast but describes a sensation of heat and pressure in her nipple. She continues to exclusively breastfeed on the affected side. She reports no systemic symptoms such as fevers, chills, or body aches but mentions feeling unwell today and experiencing a headache. The severity of her symptoms escalated yesterday. Her infant is 3.5 months old . She has been managing the pain with Tylenol and ice application.       Review of Systems   Constitutional:  Negative for chills and fever.   Skin:         +right breast pain   Neurological:  Positive for headaches.       Medications:    Current Outpatient Medications on File Prior to Visit   Medication Sig Dispense Refill    ibuprofen (MOTRIN) 800 MG Tab Take 1 Tablet by mouth every 8 hours as needed for Moderate Pain. 30 Tablet 0     No current facility-administered medications on file prior to visit.        Allergies:   Sulfa drugs    Problem List:   Patient Active Problem List   Diagnosis    Family planning    Preventative health care    Epigastric pain    Left-sided low back pain with left-sided sciatica    Closed fracture of multiple bones of right hand    Term pregnancy     (spontaneous vaginal delivery)        Surgical  "History:  Past Surgical History:   Procedure Laterality Date    ORIF, FINGER Left 4/4/2019    Procedure: ORIF, FINGER- middle, ring, small;  Surgeon: Nas Merino M.D.;  Location: SUR[facility];  Service: Orthopedics    TONSILLECTOMY  1994       Past Social Hx:   Social History     Tobacco Use    Smoking status: Never    Smokeless tobacco: Never   Vaping Use    Vaping status: Never Used   Substance Use Topics    Alcohol use: Not Currently     Alcohol/week: 0.0 oz     Comment: 3 drinks week    Drug use: Not Currently          Problem list, medications, and allergies reviewed by myself today in Epic.     Objective:     /64 (BP Location: Left arm, Patient Position: Sitting, BP Cuff Size: Adult)   Pulse 68   Temp 37 °C (98.6 °F) (Temporal)   Resp 16   Ht 1.727 m (5' 8\")   Wt 59.9 kg (132 lb)   SpO2 98%   Breastfeeding Yes   BMI 20.07 kg/m²     Physical Exam  Vitals and nursing note reviewed.   Constitutional:       General: She is not in acute distress.     Appearance: Normal appearance. She is normal weight. She is not ill-appearing, toxic-appearing or diaphoretic.   HENT:      Head: Normocephalic and atraumatic.   Cardiovascular:      Rate and Rhythm: Normal rate and regular rhythm.      Pulses: Normal pulses.      Heart sounds: Normal heart sounds. No murmur heard.     No friction rub. No gallop.   Pulmonary:      Effort: Pulmonary effort is normal. No respiratory distress.      Breath sounds: Normal breath sounds. No stridor. No wheezing, rhonchi or rales.   Chest:      Chest wall: Tenderness present.       Skin:     General: Skin is warm and dry.      Capillary Refill: Capillary refill takes less than 2 seconds.   Neurological:      General: No focal deficit present.      Mental Status: She is alert and oriented to person, place, and time. Mental status is at baseline.      Gait: Gait normal.   Psychiatric:         Mood and Affect: Mood normal.         Behavior: Behavior normal.         " Thought Content: Thought content normal.         Judgment: Judgment normal.         Assessment/Plan:     Diagnosis and associated orders:   1. Breast pain  - cephALEXin (KEFLEX) 500 MG Cap; Take 1 Capsule by mouth 3 times a day for 5 days.  Dispense: 15 Capsule; Refill: 0        Comments/MDM:   Pt is clinically stable at today's acute urgent care visit.  No acute distress noted. Appropriate for outpatient management at this time.     Assessment & Plan    Reports pain and discomfort in the breast, particularly under the armpit, which has worsened over the past 4 days. No red streaks or redness observed; vital signs are normal. Advised to continue breastfeeding, apply ice, and switch from Tylenol to ibuprofen or Motrin for better anti-inflammatory effects. A trial of heat application may also be considered. Prescription for Keflex 500 mg sent to pharmacy for a 5-day course to cover for developing mastitis. Monitor baby for any signs of diarrhea.            Discussed DDx, management options (risks,benefits, and alternatives to planned treatment), natural progression and supportive care.  Expressed understanding and the treatment plan was agreed upon. Questions were encouraged and answered   Return to urgent care prn if new or worsening sx or if there is no improvement in condition prn.    Educated in Red flags and indications to immediately call 911 or present to the Emergency Department.   Advised the patient to follow-up with the primary care physician for recheck, reevaluation, and consideration of further management.    I personally reviewed prior external notes and test results pertinent to today's visit.  I have independently reviewed and interpreted all diagnostics ordered during this urgent care acute visit.       Please note that this dictation was created using voice recognition software. I have made a reasonable attempt to correct obvious errors, but I expect that there are errors of grammar and possibly  content that I did not discover before finalizing the note.    This note was electronically signed by ANDREA Pritchard     No

## (undated) DEVICE — SENSOR SPO2 NEO LNCS ADHESIVE (20/BX) SEE USER NOTES

## (undated) DEVICE — HUMID-VENT HEAT AND MOISTURE EXCHANGE- (50/BX)

## (undated) DEVICE — PADDING CAST 4 IN STERILE - 4 X 4 YDS (24/CA)

## (undated) DEVICE — GOWN WARMING STANDARD FLEX - (30/CA)

## (undated) DEVICE — NEPTUNE 4 PORT MANIFOLD - (20/PK)

## (undated) DEVICE — SPONGE GAUZESTER 4 X 4 4PLY - (128PK/CA)

## (undated) DEVICE — LACTATED RINGERS INJ 1000 ML - (14EA/CA 60CA/PF)

## (undated) DEVICE — SUTURE 4-0 ETHILON PS-2 18 (12PK/BX)"

## (undated) DEVICE — GLOVE BIOGEL SZ 7.5 SURGICAL PF LTX - (50PR/BX 4BX/CA)

## (undated) DEVICE — Device

## (undated) DEVICE — SODIUM CHL IRRIGATION 0.9% 1000ML (12EA/CA)

## (undated) DEVICE — CANISTER SUCTION RIGID RED 1500CC (40EA/CA)

## (undated) DEVICE — DRESSING XEROFORM 1X8 - (50/BX 4BX/CA)

## (undated) DEVICE — SPLINT PLASTER 3 IN X 15 IN - (50/BX 12BX/CA)

## (undated) DEVICE — GLOVE BIOGEL INDICATOR SZ 8 SURGICAL PF LTX - (50/BX 4BX/CA)

## (undated) DEVICE — STOCKINET BIAS 4 IN STERILE - (20/CA)

## (undated) DEVICE — BAG, SPONGE COUNT 50600

## (undated) DEVICE — SUTURE GENERAL

## (undated) DEVICE — TOURNIQUET CUFF 18 X 3 ONE PORT DISP - STERILE (10/BX)

## (undated) DEVICE — GLOVE BIOGEL INDICATOR SZ 7.5 SURGICAL PF LTX - (50PR/BX 4BX/CA)

## (undated) DEVICE — GLOVE, LITE (PAIR)

## (undated) DEVICE — GLOVE BIOGEL SZ 7 SURGICAL PF LTX - (50PR/BX 4BX/CA)

## (undated) DEVICE — GLOVE BIOGEL INDICATOR SZ 7SURGICAL PF LTX - (50/BX 4BX/CA)

## (undated) DEVICE — PROTECTOR ULNA NERVE - (36PR/CA)

## (undated) DEVICE — GLOVE BIOGEL SZ 8 SURGICAL PF LTX - (50PR/BX 4BX/CA)

## (undated) DEVICE — MASK ANESTHESIA ADULT  - (100/CA)

## (undated) DEVICE — SUCTION INSTRUMENT YANKAUER BULBOUS TIP W/O VENT (50EA/CA)

## (undated) DEVICE — TUBE CONNECTING SUCTION - CLEAR PLASTIC STERILE 72 IN (50EA/CA)

## (undated) DEVICE — KIT ANESTHESIA W/CIRCUIT & 3/LT BAG W/FILTER (20EA/CA)

## (undated) DEVICE — PACK UPPER EXTREMITY SM OR - (3/CA)

## (undated) DEVICE — DRAPE C-ARM LARGE 41IN X 74 IN - (10/BX 2BX/CA)

## (undated) DEVICE — ELECTRODE DUAL RETURN W/ CORD - (50/PK)

## (undated) DEVICE — HEAD HOLDER JUNIOR/ADULT

## (undated) DEVICE — WATER IRRIGATION STERILE 1000ML (12EA/CA)

## (undated) DEVICE — KIT ROOM DECONTAMINATION

## (undated) DEVICE — SUTURE 3-0 VICRYL PLUS RB-1 - 8 X 18 INCH (12/BX)

## (undated) DEVICE — ELECTRODE 850 FOAM ADHESIVE - HYDROGEL RADIOTRNSPRNT (50/PK)